# Patient Record
Sex: FEMALE | Race: BLACK OR AFRICAN AMERICAN | Employment: UNEMPLOYED | URBAN - METROPOLITAN AREA
[De-identification: names, ages, dates, MRNs, and addresses within clinical notes are randomized per-mention and may not be internally consistent; named-entity substitution may affect disease eponyms.]

---

## 2018-03-12 DIAGNOSIS — R56.9 SEIZURE (HCC): ICD-10-CM

## 2018-03-12 RX ORDER — LACOSAMIDE 100 MG/1
100 TABLET ORAL 2 TIMES DAILY
Qty: 60 TABLET | Refills: 3 | OUTPATIENT
Start: 2018-03-12 | End: 2018-07-09 | Stop reason: SDUPTHER

## 2018-05-09 ENCOUNTER — HOSPITAL ENCOUNTER (EMERGENCY)
Age: 63
Discharge: HOME | End: 2018-05-10
Attending: EMERGENCY MEDICINE
Payer: MEDICAID

## 2018-05-09 DIAGNOSIS — R56.9 SEIZURE-LIKE ACTIVITY (HCC): Primary | ICD-10-CM

## 2018-05-09 PROCEDURE — 99284 EMERGENCY DEPT VISIT MOD MDM: CPT

## 2018-05-09 RX ORDER — 0.9 % SODIUM CHLORIDE 0.9 %
1000 INTRAVENOUS SOLUTION INTRAVENOUS ONCE
Status: COMPLETED | OUTPATIENT
Start: 2018-05-09 | End: 2018-05-10

## 2018-05-09 RX ORDER — SODIUM CHLORIDE 0.9 % (FLUSH) 0.9 %
10 SYRINGE (ML) INJECTION PRN
Status: DISCONTINUED | OUTPATIENT
Start: 2018-05-09 | End: 2018-05-10 | Stop reason: HOSPADM

## 2018-05-09 ASSESSMENT — ENCOUNTER SYMPTOMS
ABDOMINAL PAIN: 0
COUGH: 0
NAUSEA: 0
VOMITING: 0
BLOOD IN STOOL: 0
SHORTNESS OF BREATH: 0
BACK PAIN: 0

## 2018-05-10 VITALS
WEIGHT: 130 LBS | OXYGEN SATURATION: 97 % | RESPIRATION RATE: 18 BRPM | TEMPERATURE: 98.1 F | HEIGHT: 60 IN | HEART RATE: 75 BPM | DIASTOLIC BLOOD PRESSURE: 63 MMHG | SYSTOLIC BLOOD PRESSURE: 99 MMHG | BODY MASS INDEX: 25.52 KG/M2

## 2018-05-10 LAB
ALBUMIN SERPL-MCNC: 3.7 G/DL (ref 3.5–5.2)
ALP BLD-CCNC: 145 U/L (ref 35–104)
ALT SERPL-CCNC: 8 U/L (ref 0–32)
ANION GAP SERPL CALCULATED.3IONS-SCNC: 10 MMOL/L (ref 7–16)
AST SERPL-CCNC: 18 U/L (ref 0–31)
BASOPHILS ABSOLUTE: 0.05 E9/L (ref 0–0.2)
BASOPHILS RELATIVE PERCENT: 0.5 % (ref 0–2)
BILIRUB SERPL-MCNC: <0.2 MG/DL (ref 0–1.2)
BUN BLDV-MCNC: 16 MG/DL (ref 8–23)
CALCIUM SERPL-MCNC: 9.3 MG/DL (ref 8.6–10.2)
CHLORIDE BLD-SCNC: 103 MMOL/L (ref 98–107)
CO2: 27 MMOL/L (ref 22–29)
CREAT SERPL-MCNC: 0.8 MG/DL (ref 0.5–1)
EOSINOPHILS ABSOLUTE: 0.11 E9/L (ref 0.05–0.5)
EOSINOPHILS RELATIVE PERCENT: 1.1 % (ref 0–6)
GFR AFRICAN AMERICAN: >60
GFR NON-AFRICAN AMERICAN: >60 ML/MIN/1.73
GLUCOSE BLD-MCNC: 93 MG/DL (ref 74–109)
HCT VFR BLD CALC: 39.6 % (ref 34–48)
HEMOGLOBIN: 12.7 G/DL (ref 11.5–15.5)
IMMATURE GRANULOCYTES #: 0.04 E9/L
IMMATURE GRANULOCYTES %: 0.4 % (ref 0–5)
LACTIC ACID: 1.2 MMOL/L (ref 0.5–2.2)
LYMPHOCYTES ABSOLUTE: 2.28 E9/L (ref 1.5–4)
LYMPHOCYTES RELATIVE PERCENT: 23.5 % (ref 20–42)
MCH RBC QN AUTO: 28.9 PG (ref 26–35)
MCHC RBC AUTO-ENTMCNC: 32.1 % (ref 32–34.5)
MCV RBC AUTO: 90.2 FL (ref 80–99.9)
MONOCYTES ABSOLUTE: 1.14 E9/L (ref 0.1–0.95)
MONOCYTES RELATIVE PERCENT: 11.8 % (ref 2–12)
NEUTROPHILS ABSOLUTE: 6.07 E9/L (ref 1.8–7.3)
NEUTROPHILS RELATIVE PERCENT: 62.7 % (ref 43–80)
PDW BLD-RTO: 13.3 FL (ref 11.5–15)
PLATELET # BLD: 209 E9/L (ref 130–450)
PMV BLD AUTO: 12.2 FL (ref 7–12)
POTASSIUM SERPL-SCNC: 4.5 MMOL/L (ref 3.5–5)
RBC # BLD: 4.39 E12/L (ref 3.5–5.5)
SODIUM BLD-SCNC: 140 MMOL/L (ref 132–146)
TOTAL PROTEIN: 7.4 G/DL (ref 6.4–8.3)
WBC # BLD: 9.7 E9/L (ref 4.5–11.5)

## 2018-05-10 PROCEDURE — 80053 COMPREHEN METABOLIC PANEL: CPT

## 2018-05-10 PROCEDURE — 2580000003 HC RX 258: Performed by: EMERGENCY MEDICINE

## 2018-05-10 PROCEDURE — 83605 ASSAY OF LACTIC ACID: CPT

## 2018-05-10 PROCEDURE — 85025 COMPLETE CBC W/AUTO DIFF WBC: CPT

## 2018-05-10 RX ADMIN — SODIUM CHLORIDE 1000 ML: 9 INJECTION, SOLUTION INTRAVENOUS at 00:09

## 2018-05-21 ENCOUNTER — HOSPITAL ENCOUNTER (EMERGENCY)
Age: 63
Discharge: HOME | End: 2018-05-22
Attending: EMERGENCY MEDICINE
Payer: MEDICAID

## 2018-05-21 ENCOUNTER — APPOINTMENT (OUTPATIENT)
Dept: GENERAL RADIOLOGY | Age: 63
End: 2018-05-21
Payer: MEDICAID

## 2018-05-21 VITALS
SYSTOLIC BLOOD PRESSURE: 125 MMHG | TEMPERATURE: 97 F | OXYGEN SATURATION: 97 % | WEIGHT: 130 LBS | RESPIRATION RATE: 17 BRPM | DIASTOLIC BLOOD PRESSURE: 73 MMHG | BODY MASS INDEX: 25.39 KG/M2 | HEART RATE: 66 BPM

## 2018-05-21 DIAGNOSIS — R56.9 SEIZURE (HCC): Primary | ICD-10-CM

## 2018-05-21 LAB
BASOPHILS ABSOLUTE: 0.06 E9/L (ref 0–0.2)
BASOPHILS RELATIVE PERCENT: 0.7 % (ref 0–2)
EOSINOPHILS ABSOLUTE: 0.16 E9/L (ref 0.05–0.5)
EOSINOPHILS RELATIVE PERCENT: 1.8 % (ref 0–6)
HCT VFR BLD CALC: 38.3 % (ref 34–48)
HEMOGLOBIN: 11.9 G/DL (ref 11.5–15.5)
IMMATURE GRANULOCYTES #: 0.02 E9/L
IMMATURE GRANULOCYTES %: 0.2 % (ref 0–5)
LYMPHOCYTES ABSOLUTE: 2.81 E9/L (ref 1.5–4)
LYMPHOCYTES RELATIVE PERCENT: 31 % (ref 20–42)
MCH RBC QN AUTO: 28.1 PG (ref 26–35)
MCHC RBC AUTO-ENTMCNC: 31.1 % (ref 32–34.5)
MCV RBC AUTO: 90.3 FL (ref 80–99.9)
MONOCYTES ABSOLUTE: 1.24 E9/L (ref 0.1–0.95)
MONOCYTES RELATIVE PERCENT: 13.7 % (ref 2–12)
NEUTROPHILS ABSOLUTE: 4.77 E9/L (ref 1.8–7.3)
NEUTROPHILS RELATIVE PERCENT: 52.6 % (ref 43–80)
PDW BLD-RTO: 13.3 FL (ref 11.5–15)
PLATELET # BLD: 222 E9/L (ref 130–450)
PMV BLD AUTO: 12.7 FL (ref 7–12)
RBC # BLD: 4.24 E12/L (ref 3.5–5.5)
WBC # BLD: 9.1 E9/L (ref 4.5–11.5)

## 2018-05-21 PROCEDURE — 99284 EMERGENCY DEPT VISIT MOD MDM: CPT

## 2018-05-21 PROCEDURE — 80053 COMPREHEN METABOLIC PANEL: CPT

## 2018-05-21 PROCEDURE — 94761 N-INVAS EAR/PLS OXIMETRY MLT: CPT

## 2018-05-21 PROCEDURE — 96365 THER/PROPH/DIAG IV INF INIT: CPT

## 2018-05-21 PROCEDURE — 36415 COLL VENOUS BLD VENIPUNCTURE: CPT

## 2018-05-21 PROCEDURE — 2580000003 HC RX 258: Performed by: EMERGENCY MEDICINE

## 2018-05-21 PROCEDURE — 71045 X-RAY EXAM CHEST 1 VIEW: CPT

## 2018-05-21 PROCEDURE — 6360000002 HC RX W HCPCS: Performed by: EMERGENCY MEDICINE

## 2018-05-21 PROCEDURE — 85025 COMPLETE CBC W/AUTO DIFF WBC: CPT

## 2018-05-21 RX ORDER — 0.9 % SODIUM CHLORIDE 0.9 %
500 INTRAVENOUS SOLUTION INTRAVENOUS ONCE
Status: COMPLETED | OUTPATIENT
Start: 2018-05-21 | End: 2018-05-22

## 2018-05-21 RX ORDER — LEVETIRACETAM 5 MG/ML
500 INJECTION INTRAVASCULAR EVERY 12 HOURS
Status: DISCONTINUED | OUTPATIENT
Start: 2018-05-21 | End: 2018-05-22 | Stop reason: HOSPADM

## 2018-05-21 RX ADMIN — SODIUM CHLORIDE 500 ML: 9 INJECTION, SOLUTION INTRAVENOUS at 23:13

## 2018-05-21 RX ADMIN — LEVETIRACETAM 500 MG: 5 INJECTION INTRAVENOUS at 23:25

## 2018-05-22 LAB
ALBUMIN SERPL-MCNC: 3.9 G/DL (ref 3.5–5.2)
ALP BLD-CCNC: 128 U/L (ref 35–104)
ALT SERPL-CCNC: 14 U/L (ref 0–32)
ANION GAP SERPL CALCULATED.3IONS-SCNC: 11 MMOL/L (ref 7–16)
AST SERPL-CCNC: 29 U/L (ref 0–31)
BILIRUB SERPL-MCNC: 0.2 MG/DL (ref 0–1.2)
BUN BLDV-MCNC: 19 MG/DL (ref 8–23)
CALCIUM SERPL-MCNC: 9.5 MG/DL (ref 8.6–10.2)
CHLORIDE BLD-SCNC: 102 MMOL/L (ref 98–107)
CO2: 29 MMOL/L (ref 22–29)
CREAT SERPL-MCNC: 0.9 MG/DL (ref 0.5–1)
GFR AFRICAN AMERICAN: >60
GFR NON-AFRICAN AMERICAN: >60 ML/MIN/1.73
GLUCOSE BLD-MCNC: 92 MG/DL (ref 74–109)
POTASSIUM SERPL-SCNC: 5.5 MMOL/L (ref 3.5–5)
SODIUM BLD-SCNC: 142 MMOL/L (ref 132–146)
TOTAL PROTEIN: 7.4 G/DL (ref 6.4–8.3)

## 2018-06-07 LAB
EKG ATRIAL RATE: 59 BPM
EKG P AXIS: 69 DEGREES
EKG P-R INTERVAL: 146 MS
EKG Q-T INTERVAL: 408 MS
EKG QRS DURATION: 114 MS
EKG QTC CALCULATION (BAZETT): 403 MS
EKG R AXIS: 35 DEGREES
EKG T AXIS: -156 DEGREES
EKG VENTRICULAR RATE: 59 BPM

## 2018-07-09 DIAGNOSIS — R56.9 SEIZURE (HCC): ICD-10-CM

## 2018-07-09 RX ORDER — LACOSAMIDE 100 MG/1
100 TABLET ORAL 2 TIMES DAILY
Qty: 60 TABLET | Refills: 3 | Status: SHIPPED | OUTPATIENT
Start: 2018-07-09 | End: 2018-10-05 | Stop reason: DRUGHIGH

## 2018-07-16 ENCOUNTER — OFFICE VISIT (OUTPATIENT)
Dept: NEUROLOGY | Age: 63
End: 2018-07-16
Payer: MEDICAID

## 2018-07-16 VITALS
OXYGEN SATURATION: 99 % | HEART RATE: 70 BPM | DIASTOLIC BLOOD PRESSURE: 58 MMHG | HEIGHT: 62 IN | SYSTOLIC BLOOD PRESSURE: 95 MMHG

## 2018-07-16 DIAGNOSIS — R56.9 SEIZURE (HCC): Primary | ICD-10-CM

## 2018-07-16 PROCEDURE — 99214 OFFICE O/P EST MOD 30 MIN: CPT | Performed by: CLINICAL NURSE SPECIALIST

## 2018-09-04 ENCOUNTER — HOSPITAL ENCOUNTER (EMERGENCY)
Age: 63
Discharge: HOME | End: 2018-09-05
Attending: EMERGENCY MEDICINE
Payer: MEDICAID

## 2018-09-04 ENCOUNTER — APPOINTMENT (OUTPATIENT)
Dept: CT IMAGING | Age: 63
End: 2018-09-04
Payer: MEDICAID

## 2018-09-04 VITALS
DIASTOLIC BLOOD PRESSURE: 56 MMHG | TEMPERATURE: 97.6 F | HEART RATE: 79 BPM | OXYGEN SATURATION: 100 % | SYSTOLIC BLOOD PRESSURE: 101 MMHG | WEIGHT: 130 LBS | HEIGHT: 62 IN | BODY MASS INDEX: 23.92 KG/M2 | RESPIRATION RATE: 16 BRPM

## 2018-09-04 DIAGNOSIS — R56.9 SEIZURE (HCC): Primary | ICD-10-CM

## 2018-09-04 LAB
ANION GAP SERPL CALCULATED.3IONS-SCNC: 11 MMOL/L (ref 7–16)
BASOPHILS ABSOLUTE: 0.05 E9/L (ref 0–0.2)
BASOPHILS RELATIVE PERCENT: 0.5 % (ref 0–2)
BUN BLDV-MCNC: 14 MG/DL (ref 8–23)
CALCIUM SERPL-MCNC: 9.6 MG/DL (ref 8.6–10.2)
CHLORIDE BLD-SCNC: 103 MMOL/L (ref 98–107)
CO2: 27 MMOL/L (ref 22–29)
CREAT SERPL-MCNC: 0.7 MG/DL (ref 0.5–1)
EOSINOPHILS ABSOLUTE: 0.08 E9/L (ref 0.05–0.5)
EOSINOPHILS RELATIVE PERCENT: 0.8 % (ref 0–6)
GFR AFRICAN AMERICAN: >60
GFR NON-AFRICAN AMERICAN: >60 ML/MIN/1.73
GLUCOSE BLD-MCNC: 106 MG/DL (ref 74–109)
HCT VFR BLD CALC: 37.7 % (ref 34–48)
HEMOGLOBIN: 12.5 G/DL (ref 11.5–15.5)
IMMATURE GRANULOCYTES #: 0.03 E9/L
IMMATURE GRANULOCYTES %: 0.3 % (ref 0–5)
LACTIC ACID: 1.7 MMOL/L (ref 0.5–2.2)
LYMPHOCYTES ABSOLUTE: 1.81 E9/L (ref 1.5–4)
LYMPHOCYTES RELATIVE PERCENT: 18 % (ref 20–42)
MAGNESIUM: 2 MG/DL (ref 1.6–2.6)
MCH RBC QN AUTO: 29.3 PG (ref 26–35)
MCHC RBC AUTO-ENTMCNC: 33.2 % (ref 32–34.5)
MCV RBC AUTO: 88.3 FL (ref 80–99.9)
MONOCYTES ABSOLUTE: 0.97 E9/L (ref 0.1–0.95)
MONOCYTES RELATIVE PERCENT: 9.6 % (ref 2–12)
NEUTROPHILS ABSOLUTE: 7.13 E9/L (ref 1.8–7.3)
NEUTROPHILS RELATIVE PERCENT: 70.8 % (ref 43–80)
PDW BLD-RTO: 13.2 FL (ref 11.5–15)
PLATELET # BLD: 189 E9/L (ref 130–450)
PMV BLD AUTO: 13.4 FL (ref 7–12)
POTASSIUM SERPL-SCNC: 4.4 MMOL/L (ref 3.5–5)
RBC # BLD: 4.27 E12/L (ref 3.5–5.5)
SODIUM BLD-SCNC: 141 MMOL/L (ref 132–146)
WBC # BLD: 10.1 E9/L (ref 4.5–11.5)

## 2018-09-04 PROCEDURE — 80048 BASIC METABOLIC PNL TOTAL CA: CPT

## 2018-09-04 PROCEDURE — 83735 ASSAY OF MAGNESIUM: CPT

## 2018-09-04 PROCEDURE — 80177 DRUG SCRN QUAN LEVETIRACETAM: CPT

## 2018-09-04 PROCEDURE — 85025 COMPLETE CBC W/AUTO DIFF WBC: CPT

## 2018-09-04 PROCEDURE — 99284 EMERGENCY DEPT VISIT MOD MDM: CPT

## 2018-09-04 PROCEDURE — 83605 ASSAY OF LACTIC ACID: CPT

## 2018-09-04 PROCEDURE — 70450 CT HEAD/BRAIN W/O DYE: CPT

## 2018-09-04 PROCEDURE — 6360000002 HC RX W HCPCS: Performed by: EMERGENCY MEDICINE

## 2018-09-04 PROCEDURE — 94761 N-INVAS EAR/PLS OXIMETRY MLT: CPT

## 2018-09-04 PROCEDURE — 96374 THER/PROPH/DIAG INJ IV PUSH: CPT

## 2018-09-04 RX ORDER — LEVETIRACETAM 10 MG/ML
1000 INJECTION INTRAVASCULAR ONCE
Status: COMPLETED | OUTPATIENT
Start: 2018-09-04 | End: 2018-09-04

## 2018-09-04 RX ADMIN — LEVETIRACETAM 1000 MG: 10 INJECTION INTRAVENOUS at 21:10

## 2018-09-05 NOTE — ED PROVIDER NOTES
caregiver. Skin: Skin is warm and dry. She is not diaphoretic. Psychiatric: She is slowed. She is noncommunicative. Nursing note and vitals reviewed. Procedures    MDM  Number of Diagnoses or Management Options  Seizure [facility]):   Diagnosis management comments: Patient presents to the ED for seizures. Seizure precautions at place initially. The patient was evaluated for ICH and electrolyte deficits including hyponatremia and hypoglycemia. Workup in the ED revealed seizure. The patient was monitored in the ED with no further episodes. Patient returned back to her baseline on evaluation prior to disposition. Patient was given keppra for their symptoms with mild improvement. This medication was initially given as her medication list was not found until discussed with caregiver. The patient is on VIMPAT and would possibly benefit from increased dose. Patient continues to be non-toxic on re-evaluation. Findings were discussed with the patient and reasons to immediately return to the ED were articulated to them. They will follow-up with their PMD.          --------------------------------------------- PAST HISTORY ---------------------------------------------  Past Medical History:  has a past medical history of Amenorrhea; Anemia; Constipation; Developmental disability; Dry eyes; Encephalopathy; Kyphoscoliosis; Leukocytosis; Menopausal and perimenopausal disorder; Mental retardation; Osteoporosis; Seizure disorder ([facility] [address] and [facility]ures ([facility] Past Surgical History:  has no past surgical history on file. Social History:  reports that she has never smoked. She has never used smokeless tobacco. She reports that she does not drink alcohol or use drugs. Family History: family history is not on file. The patients home medications have been reviewed. Allergies: Patient has no known allergies.     -------------------------------------------------- RESULTS -------------------------------------------------    Lab  Results for orders placed or performed during the hospital encounter of 09/04/18   CBC auto differential   Result Value Ref Range    WBC 10.1 4.5 - 11.5 E9/L    RBC 4.27 3.50 - 5.50 E12/L    Hemoglobin 12.5 11.5 - 15.5 g/dL    Hematocrit 37.7 34.0 - 48.0 %    MCV 88.3 80.0 - 99.9 fL    MCH 29.3 26.0 - 35.0 pg    MCHC 33.2 32.0 - 34.5 %    RDW 13.2 11.5 - 15.0 fL    Platelets 901 261 - 939 E9/L    MPV 13.4 (H) 7.0 - 12.0 fL    Neutrophils % 70.8 43.0 - 80.0 %    Immature Granulocytes % 0.3 0.0 - 5.0 %    Lymphocytes % 18.0 (L) 20.0 - 42.0 %    Monocytes % 9.6 2.0 - 12.0 %    Eosinophils % 0.8 0.0 - 6.0 %    Basophils % 0.5 0.0 - 2.0 %    Neutrophils # 7.13 1.80 - 7.30 E9/L    Immature Granulocytes # 0.03 E9/L    Lymphocytes # 1.81 1.50 - 4.00 E9/L    Monocytes # 0.97 (H) 0.10 - 0.95 E9/L    Eosinophils # 0.08 0.05 - 0.50 E9/L    Basophils # 0.05 0.00 - 0.20 H5/M   Basic Metabolic Panel   Result Value Ref Range    Sodium 141 132 - 146 mmol/L    Potassium 4.4 3.5 - 5.0 mmol/L    Chloride 103 98 - 107 mmol/L    CO2 27 22 - 29 mmol/L    Anion Gap 11 7 - 16 mmol/L    Glucose 106 74 - 109 mg/dL    BUN 14 8 - 23 mg/dL    CREATININE 0.7 0.5 - 1.0 mg/dL    GFR Non-African American >60 >=60 mL/min/1.73    GFR African American >60     Calcium 9.6 8.6 - 10.2 mg/dL   Lactic Acid, Plasma   Result Value Ref Range    Lactic Acid 1.7 0.5 - 2.2 mmol/L   Magnesium   Result Value Ref Range    Magnesium 2.0 1.6 - 2.6 mg/dL       Radiology  CT Head WO Contrast   Final Result      No significant changes since November 2017 study. No indication for an acute intracranial event. See above comments. ------------------------- NURSING NOTES AND VITALS REVIEWED ---------------------------  Date / Time Roomed:  9/4/2018  8:07 PM  ED Bed Assignment:  NIA/NIA    The nursing notes within the ED encounter and vital signs as below have been reviewed.    Patient Vitals for the past 24 hrs:   BP Temp Temp src Pulse Resp SpO2 Height Weight   09/04/18 2315 (!) 101/56 - - - - 100 % - -   09/04/18 2245 (!) 100/53 - - - - 100 % - -   09/04/18 2215 (!) 94/57 - - - - 98 % - -   09/04/18 2158 98/60 - - - - - - -   09/04/18 2011 (!) 90/55 97.6 °F (36.4 °C) Oral 79 16 96 % 5' 2\" (1.575 m) 130 lb (59 kg)       Oxygen Saturation Interpretation: Normal    ------------------------------------------ PROGRESS NOTES ------------------------------------------  ED Course as of Sep 05 1128   Tue Sep 04, 2018   2158 Patient reassessed. Patient returning back to baseline according to caregiver at bedside now. Patient following commands. Patient able to  my hands and move all extremities. [MA]      ED Course User Index  [MA] Bernardo Joyce DO         12:31 AM  I have spoken with the patient and discussed todays results, in addition to providing specific details for the plan of care and counseling regarding the diagnosis and prognosis. Their questions are answered at this time and they are agreeable with the plan. I discussed at length with them reasons for immediate return here for re evaluation. They will followup with their primary care physician by calling their office tomorrow. --------------------------------- ADDITIONAL PROVIDER NOTES ---------------------------------  At this time the patient is without objective evidence of an acute process requiring hospitalization or inpatient management. They have remained hemodynamically stable throughout their entire ED visit and are stable for discharge with outpatient follow-up. The plan has been discussed in detail and they are aware of the specific conditions for emergent return, as well as the importance of follow-up. Discharge Medication List as of 9/4/2018 11:06 PM          Diagnosis:  1. Seizure [facility])        Disposition:  Patient's disposition: Discharge to home  Patient's condition is stable.                  Bernardo Joyce,

## 2018-09-06 LAB — KEPPRA: 42 UG/ML (ref 12–46)

## 2018-09-19 ENCOUNTER — TELEPHONE (OUTPATIENT)
Dept: ADMINISTRATIVE | Age: 63
End: 2018-09-19

## 2018-09-19 NOTE — TELEPHONE ENCOUNTER
Pt was seen in ER for a seizure on 9/4/18, per Dr. Michael Khan, he wants Sarah Beth Cox to see her. Unable to schedule within timeframe requested. Please call her to schedule.

## 2018-09-26 ENCOUNTER — TELEPHONE (OUTPATIENT)
Dept: NEUROLOGY | Age: 63
End: 2018-09-26

## 2018-10-05 ENCOUNTER — OFFICE VISIT (OUTPATIENT)
Dept: NEUROLOGY | Age: 63
End: 2018-10-05
Payer: MEDICAID

## 2018-10-05 VITALS — SYSTOLIC BLOOD PRESSURE: 103 MMHG | DIASTOLIC BLOOD PRESSURE: 71 MMHG | HEART RATE: 74 BPM | OXYGEN SATURATION: 80 %

## 2018-10-05 DIAGNOSIS — R56.9 SEIZURE (HCC): Primary | ICD-10-CM

## 2018-10-05 PROCEDURE — 99215 OFFICE O/P EST HI 40 MIN: CPT | Performed by: CLINICAL NURSE SPECIALIST

## 2018-10-05 RX ORDER — LACOSAMIDE 150 MG/1
150 TABLET ORAL 2 TIMES DAILY
Qty: 60 TABLET | Refills: 3 | Status: SHIPPED | OUTPATIENT
Start: 2018-10-05 | End: 2018-10-05 | Stop reason: SDUPTHER

## 2018-10-05 RX ORDER — LACOSAMIDE 150 MG/1
150 TABLET ORAL 2 TIMES DAILY
Qty: 60 TABLET | Refills: 2 | Status: SHIPPED | OUTPATIENT
Start: 2018-10-05 | End: 2018-12-06 | Stop reason: SDUPTHER

## 2018-10-05 NOTE — PROGRESS NOTES
Odalys Thompson is a 66 y.o.  female     From [facility]    Previously following with our office for epilepsy   Maintained on Keppra and Dilantin for years    Due to recurrent seizures, we were transitioning from Dilantin to Vimpat     Now taking 2 AEDs     Keppra 1500 BID and Vimpat 100 BID     Unfortunately suffered 4 more GTC seizures and was evaluated in the ED   She was discharged and instructed to follow with me -- appt scheduled    Since discharge, staff deny any recurrent seizures     Labs reviewed from September 2018    No new medical events reported    ROS unobtainable due to patient condition     Prior to Visit Medications    Medication Sig Taking? Authorizing Provider   lacosamide (VIMPAT) 150 MG TABS tablet Take 1 tablet by mouth 2 times daily for 90 days. Cathleen Crigler, APRN - CNS   guaiFENesin-dextromethorphan (ROBITUSSIN DM) 100-10 MG/5ML syrup Take 5 mLs by mouth 3 times daily as needed for Cough Yes Historical Provider, MD   ibuprofen (ADVIL;MOTRIN) 400 MG tablet Take 400 mg by mouth every 6 hours as needed for Pain Yes Historical Provider, MD   Sennosides (SENOKOT PO) Take 1 tablet by mouth nightly Yes Historical Provider, MD   Lactobacillus (ACIDOPHILUS/BIFIDUS PO) Take 1 tablet by mouth daily Yes Historical Provider, MD   levETIRAcetam (KEPPRA) 750 MG tablet Take 2 tablets by mouth 2 times daily Yes Ned Ya DO   alendronate (FOSAMAX) 70 MG tablet Take 70 mg by mouth every 7 days Wednesday Yes Historical Provider, MD   Multiple Vitamins-Minerals (THERAPEUTIC MULTIVITAMIN-MINERALS) tablet Take 1 tablet by mouth daily Yes Historical Provider, MD   calcium-vitamin D (OSCAL-500) 500-200 MG-UNIT per tablet Take 1 tablet by mouth 3 times daily With meals Yes Historical Provider, MD   docusate sodium (COLACE) 100 MG capsule Take 100 mg by mouth nightly. Yes Historical Provider, MD   polyethylene glycol (MIRALAX) powder Take 17 g by mouth daily.  Mixed in 8 ounces of

## 2018-11-28 LAB
ALBUMIN SERPL-MCNC: 3.8 G/DL
ALP BLD-CCNC: 121 U/L
ALT SERPL-CCNC: 15 U/L
ANION GAP SERPL CALCULATED.3IONS-SCNC: NORMAL MMOL/L
AST SERPL-CCNC: 19 U/L
BASOPHILS ABSOLUTE: 0.02 /ΜL
BASOPHILS RELATIVE PERCENT: 0.3 %
BILIRUB SERPL-MCNC: 0.3 MG/DL (ref 0.1–1.4)
BUN BLDV-MCNC: 16 MG/DL
CALCIUM SERPL-MCNC: 9.3 MG/DL
CHLORIDE BLD-SCNC: 103 MMOL/L
CHOLESTEROL, FASTING: 174
CO2: 32 MMOL/L
CREAT SERPL-MCNC: 0.6 MG/DL
EOSINOPHILS ABSOLUTE: 0.17 /ΜL
EOSINOPHILS RELATIVE PERCENT: 2.2 %
FOLATE: 12.14
GFR CALCULATED: 114.3
GLUCOSE BLD-MCNC: 81 MG/DL
HCT VFR BLD CALC: 38.1 % (ref 36–46)
HDLC SERPL-MCNC: 85 MG/DL (ref 35–70)
HEMOGLOBIN: 12.3 G/DL (ref 12–16)
LDL CHOLESTEROL CALCULATED: 81 MG/DL (ref 0–160)
LYMPHOCYTES ABSOLUTE: 2.42 /ΜL
LYMPHOCYTES RELATIVE PERCENT: 30.6 %
MCH RBC QN AUTO: 30 PG
MCHC RBC AUTO-ENTMCNC: 32.3 G/DL
MCV RBC AUTO: 93 FL
MONOCYTES ABSOLUTE: 0.71 /ΜL
MONOCYTES RELATIVE PERCENT: 0.9 %
NEUTROPHILS ABSOLUTE: 4.29 /ΜL
NEUTROPHILS RELATIVE PERCENT: 54.2 %
PDW BLD-RTO: 12.8 %
PLATELET # BLD: 335 K/ΜL
PMV BLD AUTO: ABNORMAL FL
POTASSIUM SERPL-SCNC: 4.6 MMOL/L
RBC # BLD: 4.09 10^6/ΜL
SODIUM BLD-SCNC: 140 MMOL/L
TOTAL PROTEIN: 6.4
TRIGLYCERIDE, FASTING: 41
VITAMIN B-12: 467
WBC # BLD: 7.92 10^3/ML

## 2018-12-06 DIAGNOSIS — R56.9 SEIZURE (HCC): ICD-10-CM

## 2018-12-06 RX ORDER — LACOSAMIDE 150 MG/1
150 TABLET ORAL 2 TIMES DAILY
Qty: 60 TABLET | Refills: 2 | Status: SHIPPED | OUTPATIENT
Start: 2018-12-06 | End: 2019-03-11 | Stop reason: SDUPTHER

## 2019-01-01 ENCOUNTER — HOSPITAL ENCOUNTER (OUTPATIENT)
Age: 64
Discharge: HOME | End: 2019-12-21

## 2019-01-01 ENCOUNTER — OFFICE VISIT (OUTPATIENT)
Dept: NEUROLOGY | Age: 64
End: 2019-01-01
Payer: MEDICAID

## 2019-01-01 ENCOUNTER — HOSPITAL ENCOUNTER (OUTPATIENT)
Dept: GENERAL RADIOLOGY | Age: 64
Discharge: HOME | End: 2019-07-18
Payer: MEDICAID

## 2019-01-01 VITALS — HEART RATE: 77 BPM | DIASTOLIC BLOOD PRESSURE: 76 MMHG | OXYGEN SATURATION: 97 % | SYSTOLIC BLOOD PRESSURE: 123 MMHG

## 2019-01-01 DIAGNOSIS — R56.9 SEIZURE (HCC): ICD-10-CM

## 2019-01-01 DIAGNOSIS — Z12.31 VISIT FOR SCREENING MAMMOGRAM: ICD-10-CM

## 2019-01-01 DIAGNOSIS — R56.9 SEIZURE (HCC): Primary | ICD-10-CM

## 2019-01-01 PROCEDURE — 77063 BREAST TOMOSYNTHESIS BI: CPT

## 2019-01-01 PROCEDURE — 88305 TISSUE EXAM BY PATHOLOGIST: CPT

## 2019-01-01 PROCEDURE — 99214 OFFICE O/P EST MOD 30 MIN: CPT | Performed by: CLINICAL NURSE SPECIALIST

## 2019-01-01 RX ORDER — LACOSAMIDE 150 MG/1
150 TABLET ORAL 2 TIMES DAILY
Qty: 62 TABLET | Refills: 5 | Status: ON HOLD | OUTPATIENT
Start: 2019-01-01 | End: 2020-01-01 | Stop reason: SDUPTHER

## 2019-01-10 DIAGNOSIS — Z12.11 SCREENING FOR COLON CANCER: Primary | ICD-10-CM

## 2019-01-10 LAB
CONTROL: POSITIVE
HEMOCCULT STL QL: NEGATIVE

## 2019-01-10 PROCEDURE — 82274 ASSAY TEST FOR BLOOD FECAL: CPT | Performed by: FAMILY MEDICINE

## 2019-02-04 ENCOUNTER — OFFICE VISIT (OUTPATIENT)
Dept: NEUROLOGY | Age: 64
End: 2019-02-04
Payer: MEDICAID

## 2019-02-04 VITALS — HEART RATE: 69 BPM | OXYGEN SATURATION: 81 % | DIASTOLIC BLOOD PRESSURE: 79 MMHG | SYSTOLIC BLOOD PRESSURE: 123 MMHG

## 2019-02-04 DIAGNOSIS — R56.9 SEIZURE (HCC): Primary | ICD-10-CM

## 2019-02-04 PROCEDURE — 99214 OFFICE O/P EST MOD 30 MIN: CPT | Performed by: CLINICAL NURSE SPECIALIST

## 2019-02-04 RX ORDER — ACETAMINOPHEN 325 MG/1
650 TABLET ORAL EVERY 6 HOURS PRN
COMMUNITY

## 2019-03-11 DIAGNOSIS — R56.9 SEIZURE (HCC): ICD-10-CM

## 2019-03-11 RX ORDER — LACOSAMIDE 150 MG/1
150 TABLET ORAL 2 TIMES DAILY
Qty: 62 TABLET | Refills: 2 | Status: SHIPPED | OUTPATIENT
Start: 2019-03-11 | End: 2019-06-10 | Stop reason: SDUPTHER

## 2019-06-10 DIAGNOSIS — R56.9 SEIZURE (HCC): ICD-10-CM

## 2019-06-10 RX ORDER — LACOSAMIDE 150 MG/1
150 TABLET ORAL 2 TIMES DAILY
Qty: 62 TABLET | Refills: 5 | Status: SHIPPED | OUTPATIENT
Start: 2019-06-10 | End: 2019-01-01 | Stop reason: SDUPTHER

## 2020-01-01 ENCOUNTER — APPOINTMENT (OUTPATIENT)
Dept: CT IMAGING | Age: 65
DRG: 720 | End: 2020-01-01
Payer: MEDICAID

## 2020-01-01 ENCOUNTER — OFFICE VISIT (OUTPATIENT)
Dept: NEUROLOGY | Age: 65
End: 2020-01-01
Payer: MEDICAID

## 2020-01-01 ENCOUNTER — APPOINTMENT (OUTPATIENT)
Dept: ULTRASOUND IMAGING | Age: 65
End: 2020-01-01
Payer: MEDICAID

## 2020-01-01 ENCOUNTER — APPOINTMENT (OUTPATIENT)
Dept: ULTRASOUND IMAGING | Age: 65
DRG: 720 | End: 2020-01-01
Payer: MEDICAID

## 2020-01-01 ENCOUNTER — HOSPITAL ENCOUNTER (OUTPATIENT)
Age: 65
Discharge: HOME | End: 2020-06-18

## 2020-01-01 ENCOUNTER — HOSPITAL ENCOUNTER (EMERGENCY)
Age: 65
Discharge: HOME | End: 2020-02-01
Attending: EMERGENCY MEDICINE
Payer: MEDICAID

## 2020-01-01 ENCOUNTER — HOSPITAL ENCOUNTER (INPATIENT)
Age: 65
LOS: 14 days | Discharge: HOSPICE | DRG: 720 | End: 2020-06-10
Attending: EMERGENCY MEDICINE | Admitting: INTERNAL MEDICINE
Payer: MEDICAID

## 2020-01-01 ENCOUNTER — APPOINTMENT (OUTPATIENT)
Dept: GENERAL RADIOLOGY | Age: 65
DRG: 720 | End: 2020-01-01
Payer: MEDICAID

## 2020-01-01 ENCOUNTER — ANESTHESIA EVENT (OUTPATIENT)
Dept: ENDOSCOPY | Age: 65
DRG: 720 | End: 2020-01-01
Payer: MEDICAID

## 2020-01-01 ENCOUNTER — ANESTHESIA (OUTPATIENT)
Dept: ENDOSCOPY | Age: 65
DRG: 720 | End: 2020-01-01
Payer: MEDICAID

## 2020-01-01 VITALS
SYSTOLIC BLOOD PRESSURE: 105 MMHG | DIASTOLIC BLOOD PRESSURE: 59 MMHG | RESPIRATION RATE: 19 BRPM | OXYGEN SATURATION: 99 %

## 2020-01-01 VITALS
HEIGHT: 62 IN | DIASTOLIC BLOOD PRESSURE: 84 MMHG | SYSTOLIC BLOOD PRESSURE: 111 MMHG | OXYGEN SATURATION: 95 % | HEART RATE: 77 BPM | WEIGHT: 130 LBS | RESPIRATION RATE: 18 BRPM | BODY MASS INDEX: 23.92 KG/M2 | TEMPERATURE: 98.4 F

## 2020-01-01 VITALS
SYSTOLIC BLOOD PRESSURE: 110 MMHG | HEART RATE: 93 BPM | DIASTOLIC BLOOD PRESSURE: 64 MMHG | RESPIRATION RATE: 16 BRPM | OXYGEN SATURATION: 94 %

## 2020-01-01 VITALS
OXYGEN SATURATION: 94 % | HEART RATE: 90 BPM | DIASTOLIC BLOOD PRESSURE: 69 MMHG | HEIGHT: 62 IN | SYSTOLIC BLOOD PRESSURE: 112 MMHG | BODY MASS INDEX: 22.12 KG/M2 | WEIGHT: 120.19 LBS | RESPIRATION RATE: 34 BRPM | TEMPERATURE: 98.1 F

## 2020-01-01 LAB
ADENOVIRUS BY PCR: NOT DETECTED
ALBUMIN SERPL-MCNC: 2.2 G/DL (ref 3.5–5.2)
ALBUMIN SERPL-MCNC: 2.4 G/DL (ref 3.5–5.2)
ALBUMIN SERPL-MCNC: 3.2 G/DL (ref 3.5–5.2)
ALBUMIN SERPL-MCNC: 3.9 G/DL (ref 3.5–5.2)
ALP BLD-CCNC: 106 U/L (ref 35–104)
ALP BLD-CCNC: 137 U/L (ref 35–104)
ALP BLD-CCNC: 145 U/L (ref 35–104)
ALP BLD-CCNC: 85 U/L (ref 35–104)
ALT SERPL-CCNC: 10 U/L (ref 0–32)
ALT SERPL-CCNC: 14 U/L (ref 0–32)
ALT SERPL-CCNC: 9 U/L (ref 0–32)
ALT SERPL-CCNC: 9 U/L (ref 0–32)
ANAEROBIC CULTURE: ABNORMAL
ANAEROBIC CULTURE: NORMAL
ANION GAP SERPL CALCULATED.3IONS-SCNC: 10 MMOL/L (ref 7–16)
ANION GAP SERPL CALCULATED.3IONS-SCNC: 10 MMOL/L (ref 7–16)
ANION GAP SERPL CALCULATED.3IONS-SCNC: 11 MMOL/L (ref 7–16)
ANION GAP SERPL CALCULATED.3IONS-SCNC: 7 MMOL/L (ref 7–16)
ANION GAP SERPL CALCULATED.3IONS-SCNC: 7 MMOL/L (ref 7–16)
ANION GAP SERPL CALCULATED.3IONS-SCNC: 8 MMOL/L (ref 7–16)
ANION GAP SERPL CALCULATED.3IONS-SCNC: 9 MMOL/L (ref 7–16)
ANISOCYTOSIS: ABNORMAL
ANTISTREPTOLYSIN-O: 143 IU/ML (ref 0–200)
AST SERPL-CCNC: 14 U/L (ref 0–31)
AST SERPL-CCNC: 28 U/L (ref 0–31)
AST SERPL-CCNC: 30 U/L (ref 0–31)
AST SERPL-CCNC: 32 U/L (ref 0–31)
BACTERIA: ABNORMAL /HPF
BACTERIA: NORMAL /HPF
BASOPHILS ABSOLUTE: 0 E9/L (ref 0–0.2)
BASOPHILS ABSOLUTE: 0.03 E9/L (ref 0–0.2)
BASOPHILS ABSOLUTE: 0.04 E9/L (ref 0–0.2)
BASOPHILS ABSOLUTE: 0.05 E9/L (ref 0–0.2)
BASOPHILS ABSOLUTE: 0.07 E9/L (ref 0–0.2)
BASOPHILS ABSOLUTE: 0.08 E9/L (ref 0–0.2)
BASOPHILS ABSOLUTE: 0.09 E9/L (ref 0–0.2)
BASOPHILS RELATIVE PERCENT: 0 % (ref 0–2)
BASOPHILS RELATIVE PERCENT: 0.3 % (ref 0–2)
BASOPHILS RELATIVE PERCENT: 0.4 % (ref 0–2)
BASOPHILS RELATIVE PERCENT: 0.5 % (ref 0–2)
BILIRUB SERPL-MCNC: 0.2 MG/DL (ref 0–1.2)
BILIRUB SERPL-MCNC: 0.2 MG/DL (ref 0–1.2)
BILIRUB SERPL-MCNC: 0.3 MG/DL (ref 0–1.2)
BILIRUB SERPL-MCNC: 0.5 MG/DL (ref 0–1.2)
BILIRUBIN URINE: ABNORMAL
BILIRUBIN URINE: NEGATIVE
BLOOD CULTURE, ROUTINE: NORMAL
BLOOD, URINE: NEGATIVE
BLOOD, URINE: NEGATIVE
BORDETELLA PARAPERTUSSIS BY PCR: NOT DETECTED
BORDETELLA PERTUSSIS BY PCR: NOT DETECTED
BUN BLDV-MCNC: 10 MG/DL (ref 8–23)
BUN BLDV-MCNC: 3 MG/DL (ref 8–23)
BUN BLDV-MCNC: 5 MG/DL (ref 8–23)
BUN BLDV-MCNC: 8 MG/DL (ref 8–23)
BUN BLDV-MCNC: 9 MG/DL (ref 8–23)
BUN BLDV-MCNC: <2 MG/DL (ref 8–23)
BUN BLDV-MCNC: <2 MG/DL (ref 8–23)
BURR CELLS: ABNORMAL
BURR CELLS: ABNORMAL
C-REACTIVE PROTEIN: 8.2 MG/DL (ref 0–0.4)
CA 125: 154.5 U/ML (ref 0–35)
CA 125: 163.5 U/ML (ref 0–35)
CALCIUM SERPL-MCNC: 7.8 MG/DL (ref 8.6–10.2)
CALCIUM SERPL-MCNC: 8 MG/DL (ref 8.6–10.2)
CALCIUM SERPL-MCNC: 8 MG/DL (ref 8.6–10.2)
CALCIUM SERPL-MCNC: 8.1 MG/DL (ref 8.6–10.2)
CALCIUM SERPL-MCNC: 8.2 MG/DL (ref 8.6–10.2)
CALCIUM SERPL-MCNC: 9.5 MG/DL (ref 8.6–10.2)
CALCIUM SERPL-MCNC: 9.8 MG/DL (ref 8.6–10.2)
CEA: 6.6 NG/ML (ref 0–5.2)
CHLAMYDOPHILIA PNEUMONIAE BY PCR: NOT DETECTED
CHLORIDE BLD-SCNC: 103 MMOL/L (ref 98–107)
CHLORIDE BLD-SCNC: 103 MMOL/L (ref 98–107)
CHLORIDE BLD-SCNC: 106 MMOL/L (ref 98–107)
CHLORIDE BLD-SCNC: 106 MMOL/L (ref 98–107)
CHLORIDE BLD-SCNC: 112 MMOL/L (ref 98–107)
CHLORIDE BLD-SCNC: 113 MMOL/L (ref 98–107)
CHLORIDE BLD-SCNC: 116 MMOL/L (ref 98–107)
CLARITY: ABNORMAL
CLARITY: CLEAR
CO2: 19 MMOL/L (ref 22–29)
CO2: 21 MMOL/L (ref 22–29)
CO2: 21 MMOL/L (ref 22–29)
CO2: 22 MMOL/L (ref 22–29)
CO2: 24 MMOL/L (ref 22–29)
CO2: 24 MMOL/L (ref 22–29)
CO2: 28 MMOL/L (ref 22–29)
COLOR: ABNORMAL
COLOR: YELLOW
CORONAVIRUS 229E BY PCR: NOT DETECTED
CORONAVIRUS HKU1 BY PCR: NOT DETECTED
CORONAVIRUS NL63 BY PCR: NOT DETECTED
CORONAVIRUS OC43 BY PCR: NOT DETECTED
CREAT SERPL-MCNC: 0.4 MG/DL (ref 0.5–1)
CREAT SERPL-MCNC: 0.5 MG/DL (ref 0.5–1)
CREAT SERPL-MCNC: 0.5 MG/DL (ref 0.5–1)
CREAT SERPL-MCNC: 0.6 MG/DL (ref 0.5–1)
CRYSTALS, UA: ABNORMAL /HPF
CULTURE, BLOOD 2: NORMAL
EKG ATRIAL RATE: 89 BPM
EKG P AXIS: 57 DEGREES
EKG P-R INTERVAL: 136 MS
EKG Q-T INTERVAL: 370 MS
EKG QRS DURATION: 116 MS
EKG QTC CALCULATION (BAZETT): 450 MS
EKG R AXIS: 23 DEGREES
EKG T AXIS: -100 DEGREES
EKG VENTRICULAR RATE: 89 BPM
EOSINOPHILS ABSOLUTE: 0.03 E9/L (ref 0.05–0.5)
EOSINOPHILS ABSOLUTE: 0.11 E9/L (ref 0.05–0.5)
EOSINOPHILS ABSOLUTE: 0.15 E9/L (ref 0.05–0.5)
EOSINOPHILS ABSOLUTE: 0.16 E9/L (ref 0.05–0.5)
EOSINOPHILS ABSOLUTE: 0.19 E9/L (ref 0.05–0.5)
EOSINOPHILS ABSOLUTE: 0.21 E9/L (ref 0.05–0.5)
EOSINOPHILS ABSOLUTE: 0.27 E9/L (ref 0.05–0.5)
EOSINOPHILS RELATIVE PERCENT: 0.1 % (ref 0–6)
EOSINOPHILS RELATIVE PERCENT: 0.8 % (ref 0–6)
EOSINOPHILS RELATIVE PERCENT: 0.9 % (ref 0–6)
EOSINOPHILS RELATIVE PERCENT: 1 % (ref 0–6)
EOSINOPHILS RELATIVE PERCENT: 1.3 % (ref 0–6)
EOSINOPHILS RELATIVE PERCENT: 1.4 % (ref 0–6)
EOSINOPHILS RELATIVE PERCENT: 1.6 % (ref 0–6)
EOSINOPHILS RELATIVE PERCENT: 1.7 % (ref 0–6)
EOSINOPHILS RELATIVE PERCENT: 2.1 % (ref 0–6)
GENITAL CULTURE, ROUTINE: NORMAL
GFR AFRICAN AMERICAN: >60
GFR NON-AFRICAN AMERICAN: >60 ML/MIN/1.73
GLUCOSE BLD-MCNC: 105 MG/DL (ref 74–99)
GLUCOSE BLD-MCNC: 112 MG/DL (ref 74–99)
GLUCOSE BLD-MCNC: 114 MG/DL (ref 74–99)
GLUCOSE BLD-MCNC: 88 MG/DL (ref 74–99)
GLUCOSE BLD-MCNC: 92 MG/DL (ref 74–99)
GLUCOSE BLD-MCNC: 93 MG/DL (ref 74–99)
GLUCOSE BLD-MCNC: 97 MG/DL (ref 74–99)
GLUCOSE URINE: NEGATIVE MG/DL
GLUCOSE URINE: NEGATIVE MG/DL
GRAM STAIN ORDERABLE: NORMAL
HCT VFR BLD CALC: 28.6 % (ref 34–48)
HCT VFR BLD CALC: 29.9 % (ref 34–48)
HCT VFR BLD CALC: 30.2 % (ref 34–48)
HCT VFR BLD CALC: 30.2 % (ref 34–48)
HCT VFR BLD CALC: 30.4 % (ref 34–48)
HCT VFR BLD CALC: 31.9 % (ref 34–48)
HCT VFR BLD CALC: 33.8 % (ref 34–48)
HCT VFR BLD CALC: 38 % (ref 34–48)
HCT VFR BLD CALC: 40.9 % (ref 34–48)
HEMOGLOBIN: 10.1 G/DL (ref 11.5–15.5)
HEMOGLOBIN: 10.4 G/DL (ref 11.5–15.5)
HEMOGLOBIN: 11.9 G/DL (ref 11.5–15.5)
HEMOGLOBIN: 12.8 G/DL (ref 11.5–15.5)
HEMOGLOBIN: 8.9 G/DL (ref 11.5–15.5)
HEMOGLOBIN: 9.4 G/DL (ref 11.5–15.5)
HEMOGLOBIN: 9.5 G/DL (ref 11.5–15.5)
HEMOGLOBIN: 9.6 G/DL (ref 11.5–15.5)
HEMOGLOBIN: 9.6 G/DL (ref 11.5–15.5)
HUMAN METAPNEUMOVIRUS BY PCR: NOT DETECTED
HUMAN RHINOVIRUS/ENTEROVIRUS BY PCR: NOT DETECTED
HYALINE CASTS: ABNORMAL /LPF (ref 0–2)
IMMATURE GRANULOCYTES #: 0.04 E9/L
IMMATURE GRANULOCYTES #: 0.08 E9/L
IMMATURE GRANULOCYTES #: 0.09 E9/L
IMMATURE GRANULOCYTES #: 0.1 E9/L
IMMATURE GRANULOCYTES #: 0.11 E9/L
IMMATURE GRANULOCYTES #: 0.13 E9/L
IMMATURE GRANULOCYTES #: 0.13 E9/L
IMMATURE GRANULOCYTES #: 0.16 E9/L
IMMATURE GRANULOCYTES %: 0.4 % (ref 0–5)
IMMATURE GRANULOCYTES %: 0.6 % (ref 0–5)
IMMATURE GRANULOCYTES %: 0.6 % (ref 0–5)
IMMATURE GRANULOCYTES %: 0.7 % (ref 0–5)
IMMATURE GRANULOCYTES %: 0.8 % (ref 0–5)
IMMATURE GRANULOCYTES %: 0.9 % (ref 0–5)
INFLUENZA A BY PCR: NOT DETECTED
INFLUENZA B BY PCR: NOT DETECTED
KETONES, URINE: ABNORMAL MG/DL
KETONES, URINE: NEGATIVE MG/DL
LACTIC ACID: 1.1 MMOL/L (ref 0.5–2.2)
LACTIC ACID: 2.2 MMOL/L (ref 0.5–2.2)
LEUKOCYTE ESTERASE, URINE: ABNORMAL
LEUKOCYTE ESTERASE, URINE: NEGATIVE
LIPASE: 31 U/L (ref 13–60)
LYMPHOCYTES ABSOLUTE: 1.1 E9/L (ref 1.5–4)
LYMPHOCYTES ABSOLUTE: 1.29 E9/L (ref 1.5–4)
LYMPHOCYTES ABSOLUTE: 1.4 E9/L (ref 1.5–4)
LYMPHOCYTES ABSOLUTE: 1.53 E9/L (ref 1.5–4)
LYMPHOCYTES ABSOLUTE: 1.54 E9/L (ref 1.5–4)
LYMPHOCYTES ABSOLUTE: 1.6 E9/L (ref 1.5–4)
LYMPHOCYTES ABSOLUTE: 1.94 E9/L (ref 1.5–4)
LYMPHOCYTES ABSOLUTE: 1.96 E9/L (ref 1.5–4)
LYMPHOCYTES ABSOLUTE: 2.58 E9/L (ref 1.5–4)
LYMPHOCYTES RELATIVE PERCENT: 10.8 % (ref 20–42)
LYMPHOCYTES RELATIVE PERCENT: 11 % (ref 20–42)
LYMPHOCYTES RELATIVE PERCENT: 12.1 % (ref 20–42)
LYMPHOCYTES RELATIVE PERCENT: 13 % (ref 20–42)
LYMPHOCYTES RELATIVE PERCENT: 25.6 % (ref 20–42)
LYMPHOCYTES RELATIVE PERCENT: 7 % (ref 20–42)
LYMPHOCYTES RELATIVE PERCENT: 7 % (ref 20–42)
LYMPHOCYTES RELATIVE PERCENT: 8.6 % (ref 20–42)
LYMPHOCYTES RELATIVE PERCENT: 9.7 % (ref 20–42)
MAGNESIUM: 1.7 MG/DL (ref 1.6–2.6)
MCH RBC QN AUTO: 27.5 PG (ref 26–35)
MCH RBC QN AUTO: 27.6 PG (ref 26–35)
MCH RBC QN AUTO: 27.6 PG (ref 26–35)
MCH RBC QN AUTO: 27.7 PG (ref 26–35)
MCH RBC QN AUTO: 27.7 PG (ref 26–35)
MCH RBC QN AUTO: 27.9 PG (ref 26–35)
MCH RBC QN AUTO: 27.9 PG (ref 26–35)
MCH RBC QN AUTO: 28 PG (ref 26–35)
MCH RBC QN AUTO: 28.3 PG (ref 26–35)
MCHC RBC AUTO-ENTMCNC: 30.8 % (ref 32–34.5)
MCHC RBC AUTO-ENTMCNC: 31.1 % (ref 32–34.5)
MCHC RBC AUTO-ENTMCNC: 31.3 % (ref 32–34.5)
MCHC RBC AUTO-ENTMCNC: 31.3 % (ref 32–34.5)
MCHC RBC AUTO-ENTMCNC: 31.4 % (ref 32–34.5)
MCHC RBC AUTO-ENTMCNC: 31.5 % (ref 32–34.5)
MCHC RBC AUTO-ENTMCNC: 31.6 % (ref 32–34.5)
MCHC RBC AUTO-ENTMCNC: 31.7 % (ref 32–34.5)
MCHC RBC AUTO-ENTMCNC: 31.8 % (ref 32–34.5)
MCV RBC AUTO: 87.4 FL (ref 80–99.9)
MCV RBC AUTO: 87.5 FL (ref 80–99.9)
MCV RBC AUTO: 87.8 FL (ref 80–99.9)
MCV RBC AUTO: 88.4 FL (ref 80–99.9)
MCV RBC AUTO: 88.5 FL (ref 80–99.9)
MCV RBC AUTO: 88.6 FL (ref 80–99.9)
MCV RBC AUTO: 88.7 FL (ref 80–99.9)
MCV RBC AUTO: 90.1 FL (ref 80–99.9)
MCV RBC AUTO: 90.3 FL (ref 80–99.9)
MONOCYTES ABSOLUTE: 1.25 E9/L (ref 0.1–0.95)
MONOCYTES ABSOLUTE: 1.43 E9/L (ref 0.1–0.95)
MONOCYTES ABSOLUTE: 1.51 E9/L (ref 0.1–0.95)
MONOCYTES ABSOLUTE: 1.73 E9/L (ref 0.1–0.95)
MONOCYTES ABSOLUTE: 1.84 E9/L (ref 0.1–0.95)
MONOCYTES ABSOLUTE: 1.94 E9/L (ref 0.1–0.95)
MONOCYTES ABSOLUTE: 2.05 E9/L (ref 0.1–0.95)
MONOCYTES RELATIVE PERCENT: 11 % (ref 2–12)
MONOCYTES RELATIVE PERCENT: 11.9 % (ref 2–12)
MONOCYTES RELATIVE PERCENT: 12.2 % (ref 2–12)
MONOCYTES RELATIVE PERCENT: 12.4 % (ref 2–12)
MONOCYTES RELATIVE PERCENT: 12.8 % (ref 2–12)
MONOCYTES RELATIVE PERCENT: 8.8 % (ref 2–12)
MONOCYTES RELATIVE PERCENT: 9 % (ref 2–12)
MONOCYTES RELATIVE PERCENT: 9.3 % (ref 2–12)
MONOCYTES RELATIVE PERCENT: 9.8 % (ref 2–12)
MUCUS: PRESENT /LPF
MYCOPLASMA PNEUMONIAE BY PCR: NOT DETECTED
NEUTROPHILS ABSOLUTE: 10.89 E9/L (ref 1.8–7.3)
NEUTROPHILS ABSOLUTE: 11.22 E9/L (ref 1.8–7.3)
NEUTROPHILS ABSOLUTE: 11.61 E9/L (ref 1.8–7.3)
NEUTROPHILS ABSOLUTE: 12.53 E9/L (ref 1.8–7.3)
NEUTROPHILS ABSOLUTE: 12.72 E9/L (ref 1.8–7.3)
NEUTROPHILS ABSOLUTE: 12.91 E9/L (ref 1.8–7.3)
NEUTROPHILS ABSOLUTE: 18.21 E9/L (ref 1.8–7.3)
NEUTROPHILS ABSOLUTE: 5.96 E9/L (ref 1.8–7.3)
NEUTROPHILS ABSOLUTE: 8.95 E9/L (ref 1.8–7.3)
NEUTROPHILS RELATIVE PERCENT: 59.1 % (ref 43–80)
NEUTROPHILS RELATIVE PERCENT: 72.5 % (ref 43–80)
NEUTROPHILS RELATIVE PERCENT: 72.5 % (ref 43–80)
NEUTROPHILS RELATIVE PERCENT: 74.7 % (ref 43–80)
NEUTROPHILS RELATIVE PERCENT: 77.2 % (ref 43–80)
NEUTROPHILS RELATIVE PERCENT: 79.1 % (ref 43–80)
NEUTROPHILS RELATIVE PERCENT: 79.7 % (ref 43–80)
NEUTROPHILS RELATIVE PERCENT: 81 % (ref 43–80)
NEUTROPHILS RELATIVE PERCENT: 83 % (ref 43–80)
NITRITE, URINE: NEGATIVE
NITRITE, URINE: NEGATIVE
ORGANISM: ABNORMAL
OVALOCYTES: ABNORMAL
OVALOCYTES: ABNORMAL
PARAINFLUENZA VIRUS 1 BY PCR: NOT DETECTED
PARAINFLUENZA VIRUS 2 BY PCR: NOT DETECTED
PARAINFLUENZA VIRUS 3 BY PCR: NOT DETECTED
PARAINFLUENZA VIRUS 4 BY PCR: NOT DETECTED
PDW BLD-RTO: 13 FL (ref 11.5–15)
PDW BLD-RTO: 14.3 FL (ref 11.5–15)
PDW BLD-RTO: 14.3 FL (ref 11.5–15)
PDW BLD-RTO: 14.5 FL (ref 11.5–15)
PDW BLD-RTO: 14.7 FL (ref 11.5–15)
PDW BLD-RTO: 15.1 FL (ref 11.5–15)
PDW BLD-RTO: 15.6 FL (ref 11.5–15)
PDW BLD-RTO: 15.9 FL (ref 11.5–15)
PDW BLD-RTO: 16.7 FL (ref 11.5–15)
PH UA: 7 (ref 5–9)
PH UA: 7.5 (ref 5–9)
PHOSPHORUS: 2.1 MG/DL (ref 2.5–4.5)
PLATELET # BLD: 299 E9/L (ref 130–450)
PLATELET # BLD: 302 E9/L (ref 130–450)
PLATELET # BLD: 310 E9/L (ref 130–450)
PLATELET # BLD: 326 E9/L (ref 130–450)
PLATELET # BLD: 337 E9/L (ref 130–450)
PLATELET # BLD: 350 E9/L (ref 130–450)
PLATELET # BLD: 358 E9/L (ref 130–450)
PLATELET # BLD: 397 E9/L (ref 130–450)
PLATELET # BLD: 442 E9/L (ref 130–450)
PMV BLD AUTO: 10.2 FL (ref 7–12)
PMV BLD AUTO: 10.3 FL (ref 7–12)
PMV BLD AUTO: 10.6 FL (ref 7–12)
PMV BLD AUTO: 10.6 FL (ref 7–12)
PMV BLD AUTO: 10.8 FL (ref 7–12)
PMV BLD AUTO: 10.8 FL (ref 7–12)
PMV BLD AUTO: 10.9 FL (ref 7–12)
PMV BLD AUTO: 11.2 FL (ref 7–12)
PMV BLD AUTO: 11.2 FL (ref 7–12)
POIKILOCYTES: ABNORMAL
POLYCHROMASIA: ABNORMAL
POTASSIUM REFLEX MAGNESIUM: 3.7 MMOL/L (ref 3.5–5)
POTASSIUM REFLEX MAGNESIUM: 3.9 MMOL/L (ref 3.5–5)
POTASSIUM REFLEX MAGNESIUM: 4.2 MMOL/L (ref 3.5–5)
POTASSIUM REFLEX MAGNESIUM: 4.6 MMOL/L (ref 3.5–5)
POTASSIUM SERPL-SCNC: 2.9 MMOL/L (ref 3.5–5)
POTASSIUM SERPL-SCNC: 3.3 MMOL/L (ref 3.5–5)
POTASSIUM SERPL-SCNC: 3.4 MMOL/L (ref 3.5–5)
PRO-BNP: 819 PG/ML (ref 0–125)
PROTEIN UA: ABNORMAL MG/DL
PROTEIN UA: NEGATIVE MG/DL
RBC # BLD: 3.23 E12/L (ref 3.5–5.5)
RBC # BLD: 3.37 E12/L (ref 3.5–5.5)
RBC # BLD: 3.44 E12/L (ref 3.5–5.5)
RBC # BLD: 3.45 E12/L (ref 3.5–5.5)
RBC # BLD: 3.48 E12/L (ref 3.5–5.5)
RBC # BLD: 3.61 E12/L (ref 3.5–5.5)
RBC # BLD: 3.75 E12/L (ref 3.5–5.5)
RBC # BLD: 4.29 E12/L (ref 3.5–5.5)
RBC # BLD: 4.53 E12/L (ref 3.5–5.5)
RBC UA: ABNORMAL /HPF (ref 0–2)
RBC UA: NORMAL /HPF (ref 0–2)
REASON FOR REJECTION: NORMAL
REASON FOR REJECTION: NORMAL
REJECTED TEST: NORMAL
REJECTED TEST: NORMAL
RESPIRATORY SYNCYTIAL VIRUS BY PCR: NOT DETECTED
SARS-COV-2, NAAT: NOT DETECTED
SARS-COV-2, NAAT: NOT DETECTED
SEDIMENTATION RATE, ERYTHROCYTE: 57 MM/HR (ref 0–20)
SODIUM BLD-SCNC: 136 MMOL/L (ref 132–146)
SODIUM BLD-SCNC: 136 MMOL/L (ref 132–146)
SODIUM BLD-SCNC: 138 MMOL/L (ref 132–146)
SODIUM BLD-SCNC: 141 MMOL/L (ref 132–146)
SODIUM BLD-SCNC: 141 MMOL/L (ref 132–146)
SODIUM BLD-SCNC: 143 MMOL/L (ref 132–146)
SODIUM BLD-SCNC: 145 MMOL/L (ref 132–146)
SPECIFIC GRAVITY UA: 1.02 (ref 1–1.03)
SPECIFIC GRAVITY UA: 1.02 (ref 1–1.03)
TOTAL PROTEIN: 5.4 G/DL (ref 6.4–8.3)
TOTAL PROTEIN: 6 G/DL (ref 6.4–8.3)
TOTAL PROTEIN: 7.6 G/DL (ref 6.4–8.3)
TOTAL PROTEIN: 8.1 G/DL (ref 6.4–8.3)
TROPONIN: 0.02 NG/ML (ref 0–0.03)
URINE CULTURE, ROUTINE: NORMAL
UROBILINOGEN, URINE: 0.2 E.U./DL
UROBILINOGEN, URINE: 2 E.U./DL
WBC # BLD: 10.1 E9/L (ref 4.5–11.5)
WBC # BLD: 12 E9/L (ref 4.5–11.5)
WBC # BLD: 14.5 E9/L (ref 4.5–11.5)
WBC # BLD: 15 E9/L (ref 4.5–11.5)
WBC # BLD: 15.7 E9/L (ref 4.5–11.5)
WBC # BLD: 15.9 E9/L (ref 4.5–11.5)
WBC # BLD: 16 E9/L (ref 4.5–11.5)
WBC # BLD: 16.2 E9/L (ref 4.5–11.5)
WBC # BLD: 22 E9/L (ref 4.5–11.5)
WBC UA: ABNORMAL /HPF (ref 0–5)
WBC UA: NORMAL /HPF (ref 0–5)
WOUND/ABSCESS: ABNORMAL

## 2020-01-01 PROCEDURE — 6360000002 HC RX W HCPCS: Performed by: INTERNAL MEDICINE

## 2020-01-01 PROCEDURE — 86060 ANTISTREPTOLYSIN O TITER: CPT

## 2020-01-01 PROCEDURE — 7100000011 HC PHASE II RECOVERY - ADDTL 15 MIN: Performed by: SURGERY

## 2020-01-01 PROCEDURE — 6360000002 HC RX W HCPCS: Performed by: SPECIALIST

## 2020-01-01 PROCEDURE — 87075 CULTR BACTERIA EXCEPT BLOOD: CPT

## 2020-01-01 PROCEDURE — 80053 COMPREHEN METABOLIC PANEL: CPT

## 2020-01-01 PROCEDURE — 1200000000 HC SEMI PRIVATE

## 2020-01-01 PROCEDURE — 2500000003 HC RX 250 WO HCPCS: Performed by: SPECIALIST

## 2020-01-01 PROCEDURE — 99221 1ST HOSP IP/OBS SF/LOW 40: CPT | Performed by: NURSE PRACTITIONER

## 2020-01-01 PROCEDURE — C1751 CATH, INF, PER/CENT/MIDLINE: HCPCS

## 2020-01-01 PROCEDURE — 3609017100 HC EGD: Performed by: SURGERY

## 2020-01-01 PROCEDURE — 2580000003 HC RX 258: Performed by: SPECIALIST

## 2020-01-01 PROCEDURE — 3700000001 HC ADD 15 MINUTES (ANESTHESIA): Performed by: SURGERY

## 2020-01-01 PROCEDURE — 85025 COMPLETE CBC W/AUTO DIFF WBC: CPT

## 2020-01-01 PROCEDURE — 84100 ASSAY OF PHOSPHORUS: CPT

## 2020-01-01 PROCEDURE — 87077 CULTURE AEROBIC IDENTIFY: CPT

## 2020-01-01 PROCEDURE — 6370000000 HC RX 637 (ALT 250 FOR IP): Performed by: INTERNAL MEDICINE

## 2020-01-01 PROCEDURE — 2580000003 HC RX 258: Performed by: INTERNAL MEDICINE

## 2020-01-01 PROCEDURE — 76856 US EXAM PELVIC COMPLETE: CPT

## 2020-01-01 PROCEDURE — 36415 COLL VENOUS BLD VENIPUNCTURE: CPT

## 2020-01-01 PROCEDURE — 84484 ASSAY OF TROPONIN QUANT: CPT

## 2020-01-01 PROCEDURE — 99285 EMERGENCY DEPT VISIT HI MDM: CPT

## 2020-01-01 PROCEDURE — 80048 BASIC METABOLIC PNL TOTAL CA: CPT

## 2020-01-01 PROCEDURE — B548ZZA ULTRASONOGRAPHY OF SUPERIOR VENA CAVA, GUIDANCE: ICD-10-PCS | Performed by: INTERNAL MEDICINE

## 2020-01-01 PROCEDURE — 86140 C-REACTIVE PROTEIN: CPT

## 2020-01-01 PROCEDURE — 71045 X-RAY EXAM CHEST 1 VIEW: CPT

## 2020-01-01 PROCEDURE — 85651 RBC SED RATE NONAUTOMATED: CPT

## 2020-01-01 PROCEDURE — 71260 CT THORAX DX C+: CPT

## 2020-01-01 PROCEDURE — 99233 SBSQ HOSP IP/OBS HIGH 50: CPT | Performed by: INTERNAL MEDICINE

## 2020-01-01 PROCEDURE — 83605 ASSAY OF LACTIC ACID: CPT

## 2020-01-01 PROCEDURE — 02HV33Z INSERTION OF INFUSION DEVICE INTO SUPERIOR VENA CAVA, PERCUTANEOUS APPROACH: ICD-10-PCS | Performed by: INTERNAL MEDICINE

## 2020-01-01 PROCEDURE — 81001 URINALYSIS AUTO W/SCOPE: CPT

## 2020-01-01 PROCEDURE — 6360000004 HC RX CONTRAST MEDICATION: Performed by: RADIOLOGY

## 2020-01-01 PROCEDURE — 0DJ08ZZ INSPECTION OF UPPER INTESTINAL TRACT, VIA NATURAL OR ARTIFICIAL OPENING ENDOSCOPIC: ICD-10-PCS | Performed by: SURGERY

## 2020-01-01 PROCEDURE — 83690 ASSAY OF LIPASE: CPT

## 2020-01-01 PROCEDURE — 87040 BLOOD CULTURE FOR BACTERIA: CPT

## 2020-01-01 PROCEDURE — 99223 1ST HOSP IP/OBS HIGH 75: CPT | Performed by: INTERNAL MEDICINE

## 2020-01-01 PROCEDURE — 2580000003 HC RX 258: Performed by: STUDENT IN AN ORGANIZED HEALTH CARE EDUCATION/TRAINING PROGRAM

## 2020-01-01 PROCEDURE — 2709999900 HC NON-CHARGEABLE SUPPLY: Performed by: SURGERY

## 2020-01-01 PROCEDURE — 82378 CARCINOEMBRYONIC ANTIGEN: CPT

## 2020-01-01 PROCEDURE — 6360000002 HC RX W HCPCS: Performed by: NURSE ANESTHETIST, CERTIFIED REGISTERED

## 2020-01-01 PROCEDURE — 99232 SBSQ HOSP IP/OBS MODERATE 35: CPT | Performed by: INTERNAL MEDICINE

## 2020-01-01 PROCEDURE — 36569 INSJ PICC 5 YR+ W/O IMAGING: CPT

## 2020-01-01 PROCEDURE — 76937 US GUIDE VASCULAR ACCESS: CPT

## 2020-01-01 PROCEDURE — 87070 CULTURE OTHR SPECIMN AEROBIC: CPT

## 2020-01-01 PROCEDURE — 7100000010 HC PHASE II RECOVERY - FIRST 15 MIN: Performed by: SURGERY

## 2020-01-01 PROCEDURE — 87088 URINE BACTERIA CULTURE: CPT

## 2020-01-01 PROCEDURE — 74177 CT ABD & PELVIS W/CONTRAST: CPT

## 2020-01-01 PROCEDURE — U0003 INFECTIOUS AGENT DETECTION BY NUCLEIC ACID (DNA OR RNA); SEVERE ACUTE RESPIRATORY SYNDROME CORONAVIRUS 2 (SARS-COV-2) (CORONAVIRUS DISEASE [COVID-19]), AMPLIFIED PROBE TECHNIQUE, MAKING USE OF HIGH THROUGHPUT TECHNOLOGIES AS DESCRIBED BY CMS-2020-01-R: HCPCS

## 2020-01-01 PROCEDURE — 3700000000 HC ANESTHESIA ATTENDED CARE: Performed by: SURGERY

## 2020-01-01 PROCEDURE — 0100U HC RESPIRPTHGN MULT REV TRANS & AMP PRB TECH 21 TRGT: CPT

## 2020-01-01 PROCEDURE — 93010 ELECTROCARDIOGRAM REPORT: CPT | Performed by: INTERNAL MEDICINE

## 2020-01-01 PROCEDURE — 83735 ASSAY OF MAGNESIUM: CPT

## 2020-01-01 PROCEDURE — 93005 ELECTROCARDIOGRAM TRACING: CPT | Performed by: GENERAL PRACTICE

## 2020-01-01 PROCEDURE — U0002 COVID-19 LAB TEST NON-CDC: HCPCS

## 2020-01-01 PROCEDURE — 6360000002 HC RX W HCPCS: Performed by: GENERAL PRACTICE

## 2020-01-01 PROCEDURE — 87076 CULTURE ANAEROBE IDENT EACH: CPT

## 2020-01-01 PROCEDURE — 99999 PR OFFICE/OUTPT VISIT,PROCEDURE ONLY: CPT | Performed by: INTERNAL MEDICINE

## 2020-01-01 PROCEDURE — 2580000003 HC RX 258: Performed by: NURSE ANESTHETIST, CERTIFIED REGISTERED

## 2020-01-01 PROCEDURE — 86304 IMMUNOASSAY TUMOR CA 125: CPT

## 2020-01-01 PROCEDURE — 99284 EMERGENCY DEPT VISIT MOD MDM: CPT

## 2020-01-01 PROCEDURE — 83880 ASSAY OF NATRIURETIC PEPTIDE: CPT

## 2020-01-01 PROCEDURE — 99239 HOSP IP/OBS DSCHRG MGMT >30: CPT | Performed by: INTERNAL MEDICINE

## 2020-01-01 PROCEDURE — 87186 SC STD MICRODIL/AGAR DIL: CPT

## 2020-01-01 PROCEDURE — 99214 OFFICE O/P EST MOD 30 MIN: CPT | Performed by: CLINICAL NURSE SPECIALIST

## 2020-01-01 PROCEDURE — 2580000003 HC RX 258: Performed by: GENERAL PRACTICE

## 2020-01-01 PROCEDURE — 87205 SMEAR GRAM STAIN: CPT

## 2020-01-01 PROCEDURE — 6360000002 HC RX W HCPCS

## 2020-01-01 RX ORDER — M-VIT,TX,IRON,MINS/CALC/FOLIC 27MG-0.4MG
1 TABLET ORAL DAILY
Status: DISCONTINUED | OUTPATIENT
Start: 2020-01-01 | End: 2020-01-01 | Stop reason: HOSPADM

## 2020-01-01 RX ORDER — SODIUM CHLORIDE 0.9 % (FLUSH) 0.9 %
10 SYRINGE (ML) INJECTION EVERY 12 HOURS SCHEDULED
Status: DISCONTINUED | OUTPATIENT
Start: 2020-01-01 | End: 2020-01-01 | Stop reason: HOSPADM

## 2020-01-01 RX ORDER — MORPHINE SULFATE 2 MG/ML
INJECTION, SOLUTION INTRAMUSCULAR; INTRAVENOUS
Status: COMPLETED
Start: 2020-01-01 | End: 2020-01-01

## 2020-01-01 RX ORDER — DOCUSATE SODIUM 100 MG/1
100 CAPSULE, LIQUID FILLED ORAL NIGHTLY
Status: DISCONTINUED | OUTPATIENT
Start: 2020-01-01 | End: 2020-01-01 | Stop reason: HOSPADM

## 2020-01-01 RX ORDER — POTASSIUM CHLORIDE 7.45 MG/ML
10 INJECTION INTRAVENOUS
Status: COMPLETED | OUTPATIENT
Start: 2020-01-01 | End: 2020-01-01

## 2020-01-01 RX ORDER — LEVETIRACETAM 500 MG/1
1500 TABLET ORAL 2 TIMES DAILY
Status: DISCONTINUED | OUTPATIENT
Start: 2020-01-01 | End: 2020-01-01

## 2020-01-01 RX ORDER — PROMETHAZINE HYDROCHLORIDE 25 MG/1
12.5 TABLET ORAL EVERY 6 HOURS PRN
Status: DISCONTINUED | OUTPATIENT
Start: 2020-01-01 | End: 2020-01-01 | Stop reason: HOSPADM

## 2020-01-01 RX ORDER — DEXTROSE AND SODIUM CHLORIDE 5; .45 G/100ML; G/100ML
INJECTION, SOLUTION INTRAVENOUS CONTINUOUS
Status: DISCONTINUED | OUTPATIENT
Start: 2020-01-01 | End: 2020-01-01 | Stop reason: HOSPADM

## 2020-01-01 RX ORDER — MEGESTROL ACETATE 40 MG/1
40 TABLET ORAL DAILY
Qty: 14 TABLET | Refills: 0 | Status: ON HOLD | OUTPATIENT
Start: 2020-01-01 | End: 2020-01-01

## 2020-01-01 RX ORDER — PROPOFOL 10 MG/ML
INJECTION, EMULSION INTRAVENOUS PRN
Status: DISCONTINUED | OUTPATIENT
Start: 2020-01-01 | End: 2020-01-01 | Stop reason: SDUPTHER

## 2020-01-01 RX ORDER — POLYETHYLENE GLYCOL 3350 17 G/17G
17 POWDER, FOR SOLUTION ORAL DAILY
Status: DISCONTINUED | OUTPATIENT
Start: 2020-01-01 | End: 2020-01-01 | Stop reason: HOSPADM

## 2020-01-01 RX ORDER — SODIUM CHLORIDE 9 MG/ML
INJECTION, SOLUTION INTRAVENOUS CONTINUOUS
Status: DISCONTINUED | OUTPATIENT
Start: 2020-01-01 | End: 2020-01-01

## 2020-01-01 RX ORDER — SODIUM CHLORIDE 0.9 % (FLUSH) 0.9 %
10 SYRINGE (ML) INJECTION PRN
Status: DISCONTINUED | OUTPATIENT
Start: 2020-01-01 | End: 2020-01-01 | Stop reason: HOSPADM

## 2020-01-01 RX ORDER — HEPARIN SODIUM (PORCINE) LOCK FLUSH IV SOLN 100 UNIT/ML 100 UNIT/ML
3 SOLUTION INTRAVENOUS PRN
Status: DISCONTINUED | OUTPATIENT
Start: 2020-01-01 | End: 2020-01-01 | Stop reason: HOSPADM

## 2020-01-01 RX ORDER — SODIUM CHLORIDE 9 MG/ML
25 INJECTION, SOLUTION INTRAVENOUS EVERY 8 HOURS
Status: DISCONTINUED | OUTPATIENT
Start: 2020-01-01 | End: 2020-01-01 | Stop reason: ALTCHOICE

## 2020-01-01 RX ORDER — GUAIFENESIN/DEXTROMETHORPHAN 100-10MG/5
5 SYRUP ORAL 3 TIMES DAILY PRN
Status: DISCONTINUED | OUTPATIENT
Start: 2020-01-01 | End: 2020-01-01 | Stop reason: HOSPADM

## 2020-01-01 RX ORDER — MORPHINE SULFATE 2 MG/ML
2 INJECTION, SOLUTION INTRAMUSCULAR; INTRAVENOUS ONCE
Status: COMPLETED | OUTPATIENT
Start: 2020-01-01 | End: 2020-01-01

## 2020-01-01 RX ORDER — 0.9 % SODIUM CHLORIDE 0.9 %
500 INTRAVENOUS SOLUTION INTRAVENOUS ONCE
Status: COMPLETED | OUTPATIENT
Start: 2020-01-01 | End: 2020-01-01

## 2020-01-01 RX ORDER — SENNA PLUS 8.6 MG/1
1 TABLET ORAL NIGHTLY
Status: DISCONTINUED | OUTPATIENT
Start: 2020-01-01 | End: 2020-01-01 | Stop reason: HOSPADM

## 2020-01-01 RX ORDER — HEPARIN SODIUM (PORCINE) LOCK FLUSH IV SOLN 100 UNIT/ML 100 UNIT/ML
3 SOLUTION INTRAVENOUS EVERY 12 HOURS SCHEDULED
Status: DISCONTINUED | OUTPATIENT
Start: 2020-01-01 | End: 2020-01-01 | Stop reason: HOSPADM

## 2020-01-01 RX ORDER — CALCIUM POLYCARBOPHIL 625 MG 625 MG/1
625 TABLET ORAL 2 TIMES DAILY
COMMUNITY

## 2020-01-01 RX ORDER — MEGESTROL ACETATE 40 MG/1
40 TABLET ORAL DAILY
Status: DISCONTINUED | OUTPATIENT
Start: 2020-01-01 | End: 2020-01-01

## 2020-01-01 RX ORDER — LIDOCAINE HYDROCHLORIDE 10 MG/ML
5 INJECTION, SOLUTION EPIDURAL; INFILTRATION; INTRACAUDAL; PERINEURAL ONCE
Status: COMPLETED | OUTPATIENT
Start: 2020-01-01 | End: 2020-01-01

## 2020-01-01 RX ORDER — ACETAMINOPHEN 325 MG/1
650 TABLET ORAL EVERY 6 HOURS PRN
Status: DISCONTINUED | OUTPATIENT
Start: 2020-01-01 | End: 2020-01-01 | Stop reason: HOSPADM

## 2020-01-01 RX ORDER — HYDROCODONE BITARTRATE AND ACETAMINOPHEN 5; 325 MG/1; MG/1
1 TABLET ORAL EVERY 6 HOURS PRN
COMMUNITY

## 2020-01-01 RX ORDER — SODIUM CHLORIDE 0.9 % (FLUSH) 0.9 %
10 SYRINGE (ML) INJECTION PRN
Status: DISCONTINUED | OUTPATIENT
Start: 2020-01-01 | End: 2020-01-01 | Stop reason: SDUPTHER

## 2020-01-01 RX ORDER — MORPHINE SULFATE 2 MG/ML
1 INJECTION, SOLUTION INTRAMUSCULAR; INTRAVENOUS EVERY 4 HOURS PRN
Status: DISCONTINUED | OUTPATIENT
Start: 2020-01-01 | End: 2020-01-01 | Stop reason: HOSPADM

## 2020-01-01 RX ORDER — 0.9 % SODIUM CHLORIDE 0.9 %
1000 INTRAVENOUS SOLUTION INTRAVENOUS ONCE
Status: COMPLETED | OUTPATIENT
Start: 2020-01-01 | End: 2020-01-01

## 2020-01-01 RX ORDER — ACETAMINOPHEN 325 MG/1
650 TABLET ORAL EVERY 6 HOURS PRN
Status: DISCONTINUED | OUTPATIENT
Start: 2020-01-01 | End: 2020-01-01 | Stop reason: SDUPTHER

## 2020-01-01 RX ORDER — DRONABINOL 2.5 MG/1
2.5 CAPSULE ORAL 2 TIMES DAILY
Status: DISCONTINUED | OUTPATIENT
Start: 2020-01-01 | End: 2020-01-01 | Stop reason: HOSPADM

## 2020-01-01 RX ORDER — POLYETHYLENE GLYCOL 3350 17 G/17G
17 POWDER, FOR SOLUTION ORAL DAILY PRN
Status: DISCONTINUED | OUTPATIENT
Start: 2020-01-01 | End: 2020-01-01 | Stop reason: HOSPADM

## 2020-01-01 RX ORDER — LUBIPROSTONE 24 UG/1
24 CAPSULE, GELATIN COATED ORAL 2 TIMES DAILY WITH MEALS
Status: DISCONTINUED | OUTPATIENT
Start: 2020-01-01 | End: 2020-01-01 | Stop reason: HOSPADM

## 2020-01-01 RX ORDER — BISACODYL 10 MG
10 SUPPOSITORY, RECTAL RECTAL ONCE
Status: COMPLETED | OUTPATIENT
Start: 2020-01-01 | End: 2020-01-01

## 2020-01-01 RX ORDER — ACETAMINOPHEN 650 MG/1
650 SUPPOSITORY RECTAL EVERY 6 HOURS PRN
Status: DISCONTINUED | OUTPATIENT
Start: 2020-01-01 | End: 2020-01-01 | Stop reason: HOSPADM

## 2020-01-01 RX ORDER — ONDANSETRON 2 MG/ML
4 INJECTION INTRAMUSCULAR; INTRAVENOUS EVERY 6 HOURS PRN
Status: DISCONTINUED | OUTPATIENT
Start: 2020-01-01 | End: 2020-01-01 | Stop reason: HOSPADM

## 2020-01-01 RX ORDER — SODIUM CHLORIDE 9 MG/ML
INJECTION, SOLUTION INTRAVENOUS CONTINUOUS PRN
Status: DISCONTINUED | OUTPATIENT
Start: 2020-01-01 | End: 2020-01-01 | Stop reason: SDUPTHER

## 2020-01-01 RX ORDER — HYDROCODONE BITARTRATE AND ACETAMINOPHEN 5; 325 MG/1; MG/1
1 TABLET ORAL EVERY 6 HOURS PRN
Status: DISCONTINUED | OUTPATIENT
Start: 2020-01-01 | End: 2020-01-01 | Stop reason: HOSPADM

## 2020-01-01 RX ORDER — LACOSAMIDE 100 MG/1
150 TABLET ORAL 2 TIMES DAILY
Status: DISCONTINUED | OUTPATIENT
Start: 2020-01-01 | End: 2020-01-01 | Stop reason: HOSPADM

## 2020-01-01 RX ORDER — DRONABINOL 2.5 MG/1
2.5 CAPSULE ORAL 2 TIMES DAILY
Qty: 60 CAPSULE | Refills: 0 | Status: SHIPPED | OUTPATIENT
Start: 2020-01-01 | End: 2020-07-10

## 2020-01-01 RX ORDER — LACOSAMIDE 150 MG/1
150 TABLET ORAL 2 TIMES DAILY
Qty: 62 TABLET | Refills: 5 | Status: SHIPPED | OUTPATIENT
Start: 2020-01-01 | End: 2020-12-01

## 2020-01-01 RX ADMIN — STANDARDIZED SENNA CONCENTRATE 8.6 MG: 8.6 TABLET ORAL at 21:07

## 2020-01-01 RX ADMIN — DEXTROSE AND SODIUM CHLORIDE: 5; 450 INJECTION, SOLUTION INTRAVENOUS at 02:30

## 2020-01-01 RX ADMIN — LEVETIRACETAM 1500 MG: 100 INJECTION, SOLUTION INTRAVENOUS at 01:48

## 2020-01-01 RX ADMIN — STANDARDIZED SENNA CONCENTRATE 8.6 MG: 8.6 TABLET ORAL at 20:43

## 2020-01-01 RX ADMIN — POTASSIUM CHLORIDE 10 MEQ: 10 INJECTION, SOLUTION INTRAVENOUS at 11:50

## 2020-01-01 RX ADMIN — METRONIDAZOLE 500 MG: 500 INJECTION, SOLUTION INTRAVENOUS at 07:00

## 2020-01-01 RX ADMIN — MORPHINE SULFATE 1 MG: 2 INJECTION, SOLUTION INTRAMUSCULAR; INTRAVENOUS at 09:00

## 2020-01-01 RX ADMIN — METRONIDAZOLE 500 MG: 500 INJECTION, SOLUTION INTRAVENOUS at 08:37

## 2020-01-01 RX ADMIN — LEVETIRACETAM 1500 MG: 100 INJECTION, SOLUTION INTRAVENOUS at 02:06

## 2020-01-01 RX ADMIN — METRONIDAZOLE 500 MG: 500 INJECTION, SOLUTION INTRAVENOUS at 22:53

## 2020-01-01 RX ADMIN — LACOSAMIDE 150 MG: 50 TABLET, FILM COATED ORAL at 09:54

## 2020-01-01 RX ADMIN — SODIUM CHLORIDE: 9 INJECTION, SOLUTION INTRAVENOUS at 01:59

## 2020-01-01 RX ADMIN — VANCOMYCIN HYDROCHLORIDE 1000 MG: 1 INJECTION, POWDER, LYOPHILIZED, FOR SOLUTION INTRAVENOUS at 20:46

## 2020-01-01 RX ADMIN — MORPHINE SULFATE 1 MG: 2 INJECTION, SOLUTION INTRAMUSCULAR; INTRAVENOUS at 13:40

## 2020-01-01 RX ADMIN — SODIUM CHLORIDE, PRESERVATIVE FREE 10 ML: 5 INJECTION INTRAVENOUS at 09:00

## 2020-01-01 RX ADMIN — ENOXAPARIN SODIUM 40 MG: 40 INJECTION SUBCUTANEOUS at 09:37

## 2020-01-01 RX ADMIN — ENOXAPARIN SODIUM 40 MG: 40 INJECTION SUBCUTANEOUS at 10:15

## 2020-01-01 RX ADMIN — SODIUM CHLORIDE, PRESERVATIVE FREE 2 G: 5 INJECTION INTRAVENOUS at 15:08

## 2020-01-01 RX ADMIN — METRONIDAZOLE 500 MG: 500 INJECTION, SOLUTION INTRAVENOUS at 15:46

## 2020-01-01 RX ADMIN — BISACODYL 10 MG: 10 SUPPOSITORY RECTAL at 01:44

## 2020-01-01 RX ADMIN — ENOXAPARIN SODIUM 40 MG: 40 INJECTION SUBCUTANEOUS at 08:11

## 2020-01-01 RX ADMIN — DEXTROSE AND SODIUM CHLORIDE: 5; 450 INJECTION, SOLUTION INTRAVENOUS at 03:56

## 2020-01-01 RX ADMIN — SODIUM CHLORIDE, PRESERVATIVE FREE 10 ML: 5 INJECTION INTRAVENOUS at 11:21

## 2020-01-01 RX ADMIN — Medication 300 UNITS: at 23:33

## 2020-01-01 RX ADMIN — PIPERACILLIN AND TAZOBACTAM 4.5 G: 4; .5 INJECTION, POWDER, LYOPHILIZED, FOR SOLUTION INTRAVENOUS; PARENTERAL at 23:04

## 2020-01-01 RX ADMIN — SODIUM CHLORIDE: 9 INJECTION, SOLUTION INTRAVENOUS at 22:53

## 2020-01-01 RX ADMIN — ENOXAPARIN SODIUM 40 MG: 40 INJECTION SUBCUTANEOUS at 09:17

## 2020-01-01 RX ADMIN — METRONIDAZOLE 500 MG: 500 INJECTION, SOLUTION INTRAVENOUS at 00:08

## 2020-01-01 RX ADMIN — Medication 1 TABLET: at 08:11

## 2020-01-01 RX ADMIN — MORPHINE SULFATE 2 MG: 2 INJECTION, SOLUTION INTRAMUSCULAR; INTRAVENOUS at 20:04

## 2020-01-01 RX ADMIN — MORPHINE SULFATE 1 MG: 2 INJECTION, SOLUTION INTRAMUSCULAR; INTRAVENOUS at 03:43

## 2020-01-01 RX ADMIN — LEVETIRACETAM 1500 MG: 100 INJECTION, SOLUTION INTRAVENOUS at 13:04

## 2020-01-01 RX ADMIN — DEXTROSE AND SODIUM CHLORIDE: 5; 450 INJECTION, SOLUTION INTRAVENOUS at 17:00

## 2020-01-01 RX ADMIN — METRONIDAZOLE 500 MG: 500 INJECTION, SOLUTION INTRAVENOUS at 08:01

## 2020-01-01 RX ADMIN — METRONIDAZOLE 500 MG: 500 INJECTION, SOLUTION INTRAVENOUS at 06:38

## 2020-01-01 RX ADMIN — IOPAMIDOL 80 ML: 755 INJECTION, SOLUTION INTRAVENOUS at 13:19

## 2020-01-01 RX ADMIN — DEXTROSE AND SODIUM CHLORIDE: 5; 450 INJECTION, SOLUTION INTRAVENOUS at 00:18

## 2020-01-01 RX ADMIN — SODIUM CHLORIDE: 9 INJECTION, SOLUTION INTRAVENOUS at 08:37

## 2020-01-01 RX ADMIN — LUBIPROSTONE 24 MCG: 24 CAPSULE, GELATIN COATED ORAL at 08:11

## 2020-01-01 RX ADMIN — LEVETIRACETAM 1500 MG: 500 TABLET ORAL at 09:53

## 2020-01-01 RX ADMIN — SODIUM CHLORIDE, PRESERVATIVE FREE 2 G: 5 INJECTION INTRAVENOUS at 14:37

## 2020-01-01 RX ADMIN — METRONIDAZOLE 500 MG: 500 INJECTION, SOLUTION INTRAVENOUS at 23:24

## 2020-01-01 RX ADMIN — SODIUM CHLORIDE, PRESERVATIVE FREE 2 G: 5 INJECTION INTRAVENOUS at 14:55

## 2020-01-01 RX ADMIN — IOPAMIDOL 110 ML: 755 INJECTION, SOLUTION INTRAVENOUS at 20:12

## 2020-01-01 RX ADMIN — LEVETIRACETAM 1500 MG: 100 INJECTION, SOLUTION INTRAVENOUS at 13:34

## 2020-01-01 RX ADMIN — METRONIDAZOLE 500 MG: 500 INJECTION, SOLUTION INTRAVENOUS at 15:40

## 2020-01-01 RX ADMIN — DEXTROSE AND SODIUM CHLORIDE: 5; 450 INJECTION, SOLUTION INTRAVENOUS at 08:29

## 2020-01-01 RX ADMIN — POTASSIUM CHLORIDE 10 MEQ: 10 INJECTION, SOLUTION INTRAVENOUS at 15:13

## 2020-01-01 RX ADMIN — HYDROCODONE BITARTRATE AND ACETAMINOPHEN 1 TABLET: 5; 325 TABLET ORAL at 13:40

## 2020-01-01 RX ADMIN — Medication 300 UNITS: at 09:55

## 2020-01-01 RX ADMIN — MORPHINE SULFATE 1 MG: 2 INJECTION, SOLUTION INTRAMUSCULAR; INTRAVENOUS at 18:07

## 2020-01-01 RX ADMIN — SODIUM CHLORIDE: 9 INJECTION, SOLUTION INTRAVENOUS at 12:24

## 2020-01-01 RX ADMIN — SODIUM CHLORIDE: 9 INJECTION, SOLUTION INTRAVENOUS at 14:42

## 2020-01-01 RX ADMIN — LEVETIRACETAM 1500 MG: 100 INJECTION, SOLUTION INTRAVENOUS at 00:51

## 2020-01-01 RX ADMIN — DEXTROSE AND SODIUM CHLORIDE: 5; 450 INJECTION, SOLUTION INTRAVENOUS at 16:30

## 2020-01-01 RX ADMIN — METRONIDAZOLE 500 MG: 500 INJECTION, SOLUTION INTRAVENOUS at 23:59

## 2020-01-01 RX ADMIN — MORPHINE SULFATE 1 MG: 2 INJECTION, SOLUTION INTRAMUSCULAR; INTRAVENOUS at 19:32

## 2020-01-01 RX ADMIN — Medication 300 UNITS: at 03:39

## 2020-01-01 RX ADMIN — MORPHINE SULFATE 1 MG: 2 INJECTION, SOLUTION INTRAMUSCULAR; INTRAVENOUS at 04:07

## 2020-01-01 RX ADMIN — LACOSAMIDE 150 MG: 50 TABLET, FILM COATED ORAL at 10:49

## 2020-01-01 RX ADMIN — MORPHINE SULFATE 1 MG: 2 INJECTION, SOLUTION INTRAMUSCULAR; INTRAVENOUS at 02:16

## 2020-01-01 RX ADMIN — IOPAMIDOL 110 ML: 755 INJECTION, SOLUTION INTRAVENOUS at 21:43

## 2020-01-01 RX ADMIN — HYDROCODONE BITARTRATE AND ACETAMINOPHEN 1 TABLET: 5; 325 TABLET ORAL at 08:22

## 2020-01-01 RX ADMIN — METRONIDAZOLE 500 MG: 500 INJECTION, SOLUTION INTRAVENOUS at 15:25

## 2020-01-01 RX ADMIN — MORPHINE SULFATE 1 MG: 2 INJECTION, SOLUTION INTRAMUSCULAR; INTRAVENOUS at 04:23

## 2020-01-01 RX ADMIN — DOCUSATE SODIUM 100 MG: 100 CAPSULE, LIQUID FILLED ORAL at 21:44

## 2020-01-01 RX ADMIN — LUBIPROSTONE 24 MCG: 24 CAPSULE, GELATIN COATED ORAL at 09:54

## 2020-01-01 RX ADMIN — METRONIDAZOLE 500 MG: 500 INJECTION, SOLUTION INTRAVENOUS at 23:49

## 2020-01-01 RX ADMIN — METRONIDAZOLE 500 MG: 500 INJECTION, SOLUTION INTRAVENOUS at 06:31

## 2020-01-01 RX ADMIN — DEXTROSE AND SODIUM CHLORIDE: 5; 450 INJECTION, SOLUTION INTRAVENOUS at 14:54

## 2020-01-01 RX ADMIN — LEVETIRACETAM 1500 MG: 100 INJECTION, SOLUTION INTRAVENOUS at 01:27

## 2020-01-01 RX ADMIN — METRONIDAZOLE 500 MG: 500 INJECTION, SOLUTION INTRAVENOUS at 23:05

## 2020-01-01 RX ADMIN — LACOSAMIDE 150 MG: 50 TABLET, FILM COATED ORAL at 20:38

## 2020-01-01 RX ADMIN — PIPERACILLIN AND TAZOBACTAM 3.38 G: 3; .375 INJECTION, POWDER, FOR SOLUTION INTRAVENOUS at 06:33

## 2020-01-01 RX ADMIN — LEVETIRACETAM 1500 MG: 100 INJECTION, SOLUTION INTRAVENOUS at 01:09

## 2020-01-01 RX ADMIN — SODIUM CHLORIDE 25 ML: 9 INJECTION, SOLUTION INTRAVENOUS at 10:52

## 2020-01-01 RX ADMIN — LEVETIRACETAM 1500 MG: 100 INJECTION, SOLUTION INTRAVENOUS at 01:30

## 2020-01-01 RX ADMIN — POTASSIUM CHLORIDE 10 MEQ: 10 INJECTION, SOLUTION INTRAVENOUS at 21:40

## 2020-01-01 RX ADMIN — DRONABINOL 2.5 MG: 2.5 CAPSULE ORAL at 08:11

## 2020-01-01 RX ADMIN — Medication 300 UNITS: at 20:08

## 2020-01-01 RX ADMIN — METRONIDAZOLE 500 MG: 500 INJECTION, SOLUTION INTRAVENOUS at 06:39

## 2020-01-01 RX ADMIN — POLYETHYLENE GLYCOL 3350 17 G: 17 POWDER, FOR SOLUTION ORAL at 09:38

## 2020-01-01 RX ADMIN — SODIUM CHLORIDE, PRESERVATIVE FREE 10 ML: 5 INJECTION INTRAVENOUS at 07:11

## 2020-01-01 RX ADMIN — POTASSIUM CHLORIDE 10 MEQ: 10 INJECTION, SOLUTION INTRAVENOUS at 12:49

## 2020-01-01 RX ADMIN — LUBIPROSTONE 24 MCG: 24 CAPSULE, GELATIN COATED ORAL at 16:47

## 2020-01-01 RX ADMIN — METRONIDAZOLE 500 MG: 500 INJECTION, SOLUTION INTRAVENOUS at 00:14

## 2020-01-01 RX ADMIN — LEVETIRACETAM 1500 MG: 500 TABLET ORAL at 20:38

## 2020-01-01 RX ADMIN — LACOSAMIDE 150 MG: 50 TABLET, FILM COATED ORAL at 21:38

## 2020-01-01 RX ADMIN — LEVETIRACETAM 1500 MG: 100 INJECTION, SOLUTION INTRAVENOUS at 01:28

## 2020-01-01 RX ADMIN — LEVETIRACETAM 1500 MG: 100 INJECTION, SOLUTION INTRAVENOUS at 13:30

## 2020-01-01 RX ADMIN — POTASSIUM CHLORIDE 10 MEQ: 10 INJECTION, SOLUTION INTRAVENOUS at 12:45

## 2020-01-01 RX ADMIN — METRONIDAZOLE 500 MG: 500 INJECTION, SOLUTION INTRAVENOUS at 15:04

## 2020-01-01 RX ADMIN — LEVETIRACETAM 1500 MG: 100 INJECTION, SOLUTION INTRAVENOUS at 13:33

## 2020-01-01 RX ADMIN — SODIUM CHLORIDE, PRESERVATIVE FREE 2 G: 5 INJECTION INTRAVENOUS at 14:46

## 2020-01-01 RX ADMIN — SODIUM CHLORIDE, PRESERVATIVE FREE 2 G: 5 INJECTION INTRAVENOUS at 17:41

## 2020-01-01 RX ADMIN — MORPHINE SULFATE 1 MG: 2 INJECTION, SOLUTION INTRAMUSCULAR; INTRAVENOUS at 15:42

## 2020-01-01 RX ADMIN — METRONIDAZOLE 500 MG: 500 INJECTION, SOLUTION INTRAVENOUS at 14:44

## 2020-01-01 RX ADMIN — ENOXAPARIN SODIUM 40 MG: 40 INJECTION SUBCUTANEOUS at 10:58

## 2020-01-01 RX ADMIN — LIDOCAINE HYDROCHLORIDE 0.5 ML: 10 INJECTION, SOLUTION EPIDURAL; INFILTRATION; INTRACAUDAL; PERINEURAL at 14:30

## 2020-01-01 RX ADMIN — POTASSIUM CHLORIDE 10 MEQ: 10 INJECTION, SOLUTION INTRAVENOUS at 20:44

## 2020-01-01 RX ADMIN — LEVETIRACETAM 1500 MG: 100 INJECTION, SOLUTION INTRAVENOUS at 01:08

## 2020-01-01 RX ADMIN — ACETAMINOPHEN 650 MG: 650 SUPPOSITORY RECTAL at 01:06

## 2020-01-01 RX ADMIN — PROPOFOL 50 MG: 10 INJECTION, EMULSION INTRAVENOUS at 08:15

## 2020-01-01 RX ADMIN — METRONIDAZOLE 500 MG: 500 INJECTION, SOLUTION INTRAVENOUS at 16:50

## 2020-01-01 RX ADMIN — METRONIDAZOLE 500 MG: 500 INJECTION, SOLUTION INTRAVENOUS at 06:57

## 2020-01-01 RX ADMIN — POTASSIUM CHLORIDE 10 MEQ: 10 INJECTION, SOLUTION INTRAVENOUS at 12:48

## 2020-01-01 RX ADMIN — POTASSIUM CHLORIDE 10 MEQ: 10 INJECTION, SOLUTION INTRAVENOUS at 13:57

## 2020-01-01 RX ADMIN — POTASSIUM CHLORIDE 10 MEQ: 10 INJECTION, SOLUTION INTRAVENOUS at 19:41

## 2020-01-01 RX ADMIN — DEXTROSE AND SODIUM CHLORIDE: 5; 450 INJECTION, SOLUTION INTRAVENOUS at 03:47

## 2020-01-01 RX ADMIN — METRONIDAZOLE 500 MG: 500 INJECTION, SOLUTION INTRAVENOUS at 09:17

## 2020-01-01 RX ADMIN — Medication 300 UNITS: at 11:19

## 2020-01-01 RX ADMIN — SODIUM CHLORIDE, PRESERVATIVE FREE 2 G: 5 INJECTION INTRAVENOUS at 14:53

## 2020-01-01 RX ADMIN — ENOXAPARIN SODIUM 40 MG: 40 INJECTION SUBCUTANEOUS at 11:18

## 2020-01-01 RX ADMIN — POTASSIUM CHLORIDE 10 MEQ: 10 INJECTION, SOLUTION INTRAVENOUS at 15:10

## 2020-01-01 RX ADMIN — POTASSIUM CHLORIDE 10 MEQ: 10 INJECTION, SOLUTION INTRAVENOUS at 17:19

## 2020-01-01 RX ADMIN — LEVETIRACETAM 1500 MG: 100 INJECTION, SOLUTION INTRAVENOUS at 02:19

## 2020-01-01 RX ADMIN — METRONIDAZOLE 500 MG: 500 INJECTION, SOLUTION INTRAVENOUS at 08:41

## 2020-01-01 RX ADMIN — LEVETIRACETAM 1500 MG: 100 INJECTION, SOLUTION INTRAVENOUS at 12:26

## 2020-01-01 RX ADMIN — METRONIDAZOLE 500 MG: 500 INJECTION, SOLUTION INTRAVENOUS at 23:32

## 2020-01-01 RX ADMIN — MORPHINE SULFATE 1 MG: 2 INJECTION, SOLUTION INTRAMUSCULAR; INTRAVENOUS at 17:58

## 2020-01-01 RX ADMIN — METRONIDAZOLE 500 MG: 500 INJECTION, SOLUTION INTRAVENOUS at 23:30

## 2020-01-01 RX ADMIN — METRONIDAZOLE 500 MG: 500 INJECTION, SOLUTION INTRAVENOUS at 06:50

## 2020-01-01 RX ADMIN — SODIUM CHLORIDE, PRESERVATIVE FREE 10 ML: 5 INJECTION INTRAVENOUS at 21:16

## 2020-01-01 RX ADMIN — METRONIDAZOLE 500 MG: 500 INJECTION, SOLUTION INTRAVENOUS at 15:36

## 2020-01-01 RX ADMIN — SODIUM CHLORIDE 500 ML: 9 INJECTION, SOLUTION INTRAVENOUS at 21:16

## 2020-01-01 RX ADMIN — DEXTROSE AND SODIUM CHLORIDE: 5; 450 INJECTION, SOLUTION INTRAVENOUS at 03:39

## 2020-01-01 RX ADMIN — METRONIDAZOLE 500 MG: 500 INJECTION, SOLUTION INTRAVENOUS at 23:23

## 2020-01-01 RX ADMIN — ACETAMINOPHEN 650 MG: 325 TABLET ORAL at 21:39

## 2020-01-01 RX ADMIN — SODIUM CHLORIDE, PRESERVATIVE FREE 2 G: 5 INJECTION INTRAVENOUS at 14:04

## 2020-01-01 RX ADMIN — LEVETIRACETAM 1500 MG: 100 INJECTION, SOLUTION INTRAVENOUS at 12:25

## 2020-01-01 RX ADMIN — SODIUM CHLORIDE, PRESERVATIVE FREE 10 ML: 5 INJECTION INTRAVENOUS at 20:05

## 2020-01-01 RX ADMIN — ENOXAPARIN SODIUM 40 MG: 40 INJECTION SUBCUTANEOUS at 09:56

## 2020-01-01 RX ADMIN — METRONIDAZOLE 500 MG: 500 INJECTION, SOLUTION INTRAVENOUS at 17:40

## 2020-01-01 RX ADMIN — ENOXAPARIN SODIUM 40 MG: 40 INJECTION SUBCUTANEOUS at 08:55

## 2020-01-01 RX ADMIN — SODIUM CHLORIDE, PRESERVATIVE FREE 2 G: 5 INJECTION INTRAVENOUS at 16:50

## 2020-01-01 RX ADMIN — METRONIDAZOLE 500 MG: 500 INJECTION, SOLUTION INTRAVENOUS at 00:17

## 2020-01-01 RX ADMIN — METRONIDAZOLE 500 MG: 500 INJECTION, SOLUTION INTRAVENOUS at 09:55

## 2020-01-01 RX ADMIN — POTASSIUM CHLORIDE 10 MEQ: 10 INJECTION, SOLUTION INTRAVENOUS at 10:58

## 2020-01-01 RX ADMIN — Medication 1 TABLET: at 10:09

## 2020-01-01 RX ADMIN — LEVETIRACETAM 1500 MG: 100 INJECTION, SOLUTION INTRAVENOUS at 02:10

## 2020-01-01 RX ADMIN — SODIUM CHLORIDE 1000 ML: 9 INJECTION, SOLUTION INTRAVENOUS at 20:46

## 2020-01-01 RX ADMIN — SODIUM CHLORIDE, PRESERVATIVE FREE 10 ML: 5 INJECTION INTRAVENOUS at 20:36

## 2020-01-01 RX ADMIN — Medication 1 TABLET: at 10:49

## 2020-01-01 RX ADMIN — OYSTER SHELL CALCIUM WITH VITAMIN D 1 TABLET: 500; 200 TABLET, FILM COATED ORAL at 21:49

## 2020-01-01 RX ADMIN — METRONIDAZOLE 500 MG: 500 INJECTION, SOLUTION INTRAVENOUS at 00:09

## 2020-01-01 RX ADMIN — LEVETIRACETAM 1500 MG: 100 INJECTION, SOLUTION INTRAVENOUS at 16:30

## 2020-01-01 RX ADMIN — METRONIDAZOLE 500 MG: 500 INJECTION, SOLUTION INTRAVENOUS at 14:54

## 2020-01-01 RX ADMIN — SODIUM CHLORIDE, PRESERVATIVE FREE 2 G: 5 INJECTION INTRAVENOUS at 15:40

## 2020-01-01 RX ADMIN — MORPHINE SULFATE 1 MG: 2 INJECTION, SOLUTION INTRAMUSCULAR; INTRAVENOUS at 21:15

## 2020-01-01 RX ADMIN — ENOXAPARIN SODIUM 40 MG: 40 INJECTION SUBCUTANEOUS at 08:50

## 2020-01-01 RX ADMIN — Medication 300 UNITS: at 20:03

## 2020-01-01 RX ADMIN — POTASSIUM CHLORIDE 10 MEQ: 10 INJECTION, SOLUTION INTRAVENOUS at 16:15

## 2020-01-01 RX ADMIN — LEVETIRACETAM 1500 MG: 100 INJECTION, SOLUTION INTRAVENOUS at 14:23

## 2020-01-01 RX ADMIN — LEVETIRACETAM 1500 MG: 100 INJECTION, SOLUTION INTRAVENOUS at 13:14

## 2020-01-01 RX ADMIN — SODIUM CHLORIDE, PRESERVATIVE FREE 2 G: 5 INJECTION INTRAVENOUS at 15:28

## 2020-01-01 RX ADMIN — LEVETIRACETAM 1500 MG: 100 INJECTION, SOLUTION INTRAVENOUS at 13:25

## 2020-01-01 RX ADMIN — ENOXAPARIN SODIUM 40 MG: 40 INJECTION SUBCUTANEOUS at 09:19

## 2020-01-01 RX ADMIN — ACETAMINOPHEN 650 MG: 650 SUPPOSITORY RECTAL at 09:21

## 2020-01-01 RX ADMIN — SODIUM CHLORIDE, PRESERVATIVE FREE 2 G: 5 INJECTION INTRAVENOUS at 15:12

## 2020-01-01 RX ADMIN — SODIUM CHLORIDE: 9 INJECTION, SOLUTION INTRAVENOUS at 06:23

## 2020-01-01 RX ADMIN — SODIUM CHLORIDE: 9 INJECTION, SOLUTION INTRAVENOUS at 08:12

## 2020-01-01 RX ADMIN — ALTEPLASE 1 MG: 2.2 INJECTION, POWDER, LYOPHILIZED, FOR SOLUTION INTRAVENOUS at 05:46

## 2020-01-01 RX ADMIN — SODIUM CHLORIDE, PRESERVATIVE FREE 10 ML: 5 INJECTION INTRAVENOUS at 09:56

## 2020-01-01 RX ADMIN — SODIUM CHLORIDE, PRESERVATIVE FREE 10 ML: 5 INJECTION INTRAVENOUS at 20:08

## 2020-01-01 RX ADMIN — Medication 300 UNITS: at 20:36

## 2020-01-01 RX ADMIN — METRONIDAZOLE 500 MG: 500 INJECTION, SOLUTION INTRAVENOUS at 07:11

## 2020-01-01 RX ADMIN — MORPHINE SULFATE 1 MG: 2 INJECTION, SOLUTION INTRAMUSCULAR; INTRAVENOUS at 08:11

## 2020-01-01 RX ADMIN — HYDROCODONE BITARTRATE AND ACETAMINOPHEN 1 TABLET: 5; 325 TABLET ORAL at 16:47

## 2020-01-01 RX ADMIN — METRONIDAZOLE 500 MG: 500 INJECTION, SOLUTION INTRAVENOUS at 23:35

## 2020-01-01 RX ADMIN — MORPHINE SULFATE 1 MG: 2 INJECTION, SOLUTION INTRAMUSCULAR; INTRAVENOUS at 13:11

## 2020-01-01 RX ADMIN — MEGESTROL ACETATE 40 MG: 40 TABLET ORAL at 10:49

## 2020-01-01 RX ADMIN — LEVETIRACETAM 1500 MG: 100 INJECTION, SOLUTION INTRAVENOUS at 02:00

## 2020-01-01 RX ADMIN — METRONIDAZOLE 500 MG: 500 INJECTION, SOLUTION INTRAVENOUS at 16:01

## 2020-01-01 RX ADMIN — SODIUM CHLORIDE, PRESERVATIVE FREE 2 G: 5 INJECTION INTRAVENOUS at 15:47

## 2020-01-01 RX ADMIN — SODIUM CHLORIDE, PRESERVATIVE FREE 2 G: 5 INJECTION INTRAVENOUS at 15:19

## 2020-01-01 RX ADMIN — LEVETIRACETAM 1500 MG: 500 TABLET ORAL at 10:49

## 2020-01-01 RX ADMIN — ACETAMINOPHEN 650 MG: 650 SUPPOSITORY RECTAL at 12:27

## 2020-01-01 RX ADMIN — MEGESTROL ACETATE 40 MG: 40 TABLET ORAL at 09:54

## 2020-01-01 RX ADMIN — METRONIDAZOLE 500 MG: 500 INJECTION, SOLUTION INTRAVENOUS at 06:40

## 2020-01-01 RX ADMIN — STANDARDIZED SENNA CONCENTRATE 8.6 MG: 8.6 TABLET ORAL at 21:38

## 2020-01-01 RX ADMIN — LEVETIRACETAM 1500 MG: 500 TABLET ORAL at 21:51

## 2020-01-01 RX ADMIN — OYSTER SHELL CALCIUM WITH VITAMIN D 1 TABLET: 500; 200 TABLET, FILM COATED ORAL at 10:50

## 2020-01-01 RX ADMIN — METRONIDAZOLE 500 MG: 500 INJECTION, SOLUTION INTRAVENOUS at 15:44

## 2020-01-01 RX ADMIN — METRONIDAZOLE 500 MG: 500 INJECTION, SOLUTION INTRAVENOUS at 15:19

## 2020-01-01 RX ADMIN — MORPHINE SULFATE 1 MG: 2 INJECTION, SOLUTION INTRAMUSCULAR; INTRAVENOUS at 14:01

## 2020-01-01 ASSESSMENT — PAIN SCALES - GENERAL
PAINLEVEL_OUTOF10: 3
PAINLEVEL_OUTOF10: 10
PAINLEVEL_OUTOF10: 6
PAINLEVEL_OUTOF10: 0
PAINLEVEL_OUTOF10: 0
PAINLEVEL_OUTOF10: 4
PAINLEVEL_OUTOF10: 0
PAINLEVEL_OUTOF10: 3
PAINLEVEL_OUTOF10: 0
PAINLEVEL_OUTOF10: 7
PAINLEVEL_OUTOF10: 0
PAINLEVEL_OUTOF10: 5
PAINLEVEL_OUTOF10: 0
PAINLEVEL_OUTOF10: 4
PAINLEVEL_OUTOF10: 5
PAINLEVEL_OUTOF10: 0
PAINLEVEL_OUTOF10: 6
PAINLEVEL_OUTOF10: 8
PAINLEVEL_OUTOF10: 2
PAINLEVEL_OUTOF10: 0
PAINLEVEL_OUTOF10: 4
PAINLEVEL_OUTOF10: 5
PAINLEVEL_OUTOF10: 4
PAINLEVEL_OUTOF10: 6
PAINLEVEL_OUTOF10: 2
PAINLEVEL_OUTOF10: 0
PAINLEVEL_OUTOF10: 5
PAINLEVEL_OUTOF10: 4
PAINLEVEL_OUTOF10: 10
PAINLEVEL_OUTOF10: 4
PAINLEVEL_OUTOF10: 0
PAINLEVEL_OUTOF10: 0
PAINLEVEL_OUTOF10: 2
PAINLEVEL_OUTOF10: 0
PAINLEVEL_OUTOF10: 2
PAINLEVEL_OUTOF10: 4
PAINLEVEL_OUTOF10: 0
PAINLEVEL_OUTOF10: 0
PAINLEVEL_OUTOF10: 5
PAINLEVEL_OUTOF10: 7
PAINLEVEL_OUTOF10: 8
PAINLEVEL_OUTOF10: 3
PAINLEVEL_OUTOF10: 3
PAINLEVEL_OUTOF10: 5
PAINLEVEL_OUTOF10: 0
PAINLEVEL_OUTOF10: 2
PAINLEVEL_OUTOF10: 0
PAINLEVEL_OUTOF10: 3
PAINLEVEL_OUTOF10: 0

## 2020-01-01 ASSESSMENT — PAIN DESCRIPTION - PAIN TYPE
TYPE: OTHER (COMMENT)
TYPE: ACUTE PAIN
TYPE: OTHER (COMMENT)

## 2020-01-01 ASSESSMENT — PAIN SCALES - PAIN ASSESSMENT IN ADVANCED DEMENTIA (PAINAD)
NEGVOCALIZATION: 0
FACIALEXPRESSION: 1
BODYLANGUAGE: 1
BREATHING: 1
BODYLANGUAGE: 0
CONSOLABILITY: 1
NEGVOCALIZATION: 1
FACIALEXPRESSION: 0
CONSOLABILITY: 0
BODYLANGUAGE: 0
CONSOLABILITY: 1
BREATHING: 1
TOTALSCORE: 5
FACIALEXPRESSION: 0
NEGVOCALIZATION: 1
BODYLANGUAGE: 1
NEGVOCALIZATION: 1
FACIALEXPRESSION: 0
BODYLANGUAGE: 1
FACIALEXPRESSION: 0
CONSOLABILITY: 0
BREATHING: 1
NEGVOCALIZATION: 0
BREATHING: 1
TOTALSCORE: 5
NEGVOCALIZATION: 1
BREATHING: 1
FACIALEXPRESSION: 0
BODYLANGUAGE: 0
CONSOLABILITY: 0
NEGVOCALIZATION: 1
BREATHING: 1
FACIALEXPRESSION: 1
FACIALEXPRESSION: 0
CONSOLABILITY: 0
TOTALSCORE: 4
NEGVOCALIZATION: 0
BODYLANGUAGE: 1
BODYLANGUAGE: 0
CONSOLABILITY: 1
TOTALSCORE: 5
BREATHING: 0
BODYLANGUAGE: 1
FACIALEXPRESSION: 0
TOTALSCORE: 3
TOTALSCORE: 3
BREATHING: 0
BODYLANGUAGE: 1
CONSOLABILITY: 0
BREATHING: 0
BREATHING: 2
CONSOLABILITY: 0
TOTALSCORE: 4
FACIALEXPRESSION: 2
TOTALSCORE: 0
CONSOLABILITY: 1
NEGVOCALIZATION: 1
TOTALSCORE: 0
NEGVOCALIZATION: 1
TOTALSCORE: 0

## 2020-01-01 ASSESSMENT — PAIN SCALES - WONG BAKER
WONGBAKER_NUMERICALRESPONSE: 0
WONGBAKER_NUMERICALRESPONSE: 0

## 2020-01-01 ASSESSMENT — PAIN DESCRIPTION - LOCATION
LOCATION: OTHER (COMMENT)

## 2020-01-01 ASSESSMENT — PAIN DESCRIPTION - DESCRIPTORS
DESCRIPTORS: OTHER (COMMENT)
DESCRIPTORS: OTHER (COMMENT)
DESCRIPTORS: PATIENT UNABLE TO DESCRIBE
DESCRIPTORS: PATIENT UNABLE TO DESCRIBE
DESCRIPTORS: OTHER (COMMENT)

## 2020-01-01 ASSESSMENT — ENCOUNTER SYMPTOMS: ABDOMINAL PAIN: 1

## 2020-01-01 ASSESSMENT — PAIN DESCRIPTION - FREQUENCY: FREQUENCY: INTERMITTENT

## 2020-01-01 ASSESSMENT — PAIN DESCRIPTION - ONSET: ONSET: UNABLE TO TELL

## 2020-01-01 ASSESSMENT — PAIN DESCRIPTION - ORIENTATION
ORIENTATION: OTHER (COMMENT)

## 2020-02-01 NOTE — ED PROVIDER NOTES
Gunnar Hall is a 62-year-old female with PMH of MRDD nonverbal and seizure history presented from [facility] to a new living facility with vaginal bleeding and abdominal pain that has been present for 2 days and is unchanged. Patient had a D&C with Dr. Mynor Gill 12/19 and facility was informed that she would not have have any vaginal bleeding following the D&C since she is postmenopausal. Patient then began to have vaginal bleeding for two days with small clots. Patient is also having abdominal pain. Patient is nonverbal and does not respond to questions but has the appearance to staff that she is uncomfortable and has been moaning intermittently which is unusual for patient. Caregiver is unable to quantify the amount of bleeding. Patient is not bleeding through her depend. The history is provided by the nursing home, a caregiver and medical records. Vaginal Bleeding   Quality:  Typical of menses, dark red and clots  Severity:  Moderate  Onset quality:  Gradual  Duration:  2 days  Timing:  Constant  Progression:  Unchanged  Chronicity:  Recurrent  Menstrual history:  Irregular  Number of pads used:  1  Possible pregnancy: no    Context: at rest    Relieved by:  Nothing  Worsened by:  Nothing  Ineffective treatments:  None tried  Associated symptoms: abdominal pain    Risk factors: hx of endometriosis and gynecological surgery         Review of Systems   Unable to perform ROS: Patient nonverbal   Gastrointestinal: Positive for abdominal pain. Genitourinary: Positive for vaginal bleeding. Physical Exam  Vitals signs and nursing note reviewed. Constitutional:       General: She is not in acute distress. Appearance: She is not ill-appearing, toxic-appearing or diaphoretic. HENT:      Head: Normocephalic and atraumatic. Mouth/Throat:      Mouth: Mucous membranes are moist.   Eyes:      Conjunctiva/sclera: Conjunctivae normal.      Pupils: Pupils are equal, round, and reactive to light.    Neck: Musculoskeletal: Neck supple. No muscular tenderness. Cardiovascular:      Rate and Rhythm: Normal rate and regular rhythm. Pulmonary:      Effort: Pulmonary effort is normal.      Breath sounds: Normal breath sounds. Abdominal:      General: Bowel sounds are normal. There is no distension. Palpations: Abdomen is soft. Tenderness: There is no abdominal tenderness. There is no right CVA tenderness, left CVA tenderness, guarding or rebound. Musculoskeletal:      Right lower leg: No edema. Left lower leg: No edema. Skin:     General: Skin is warm and dry. Coloration: Skin is not pale. Findings: No erythema or rash. Neurological:      Mental Status: She is alert. Mental status is at baseline. Comments: Exam limited due to patient cooperation          Procedures     MDM  Number of Diagnoses or Management Options  Uterine leiomyoma, unspecified location:   Vaginal bleeding:   Diagnosis management comments: Gunnar Hall is a 62-year-old female who presented to the ED with vaginal bleeding. When patient was cathed for urine only a small amounts of bright red blood was observed, small amount of blood in depend no clots. Likely patient's symptoms due to fibroid vs abnormal uterine bleeding. Case discussed with Dr. Luis Mcdowell, she will follow up with patient Monday and requested patient be discharged on Megace. Discussed results and plan with caregiver along with indications to return to ED she is agreeable to plan. Patient is nontoxic and well appearing in ED. Pelvic exam could not be performed on patient due to contraction and body habitus. Patient did not appear in pain during exam. Hgb stable.         ED Course as of Feb 02 0340   Sat Feb 01, 2020   1179 Spoke with Dr. Luis Mcdowell, she will follow up with patient Monday she would like patient discharged on 40mg Megace QD    [SS]      ED Course User Index  [SS] Muriel Mckeon MD          ED Course as of Feb 02 0343   Sat Feb 01, 2020 1859 Spoke with Dr. Harrison Mata, she will follow up with patient Monday she would like patient discharged on 40mg Megace QD    [SS]      ED Course User Index  [SS] Miguel Angel Painter MD       --------------------------------------------- PAST HISTORY ---------------------------------------------  Past Medical History:  has a past medical history of Amenorrhea, Anemia, Constipation, Developmental disability, Dry eyes, Encephalopathy, Kyphoscoliosis, Leukocytosis, Menopausal and perimenopausal disorder, Mental retardation, Osteoporosis, Seizure disorder ([facility] [address] and Seizures ([facility] [address] Past Surgical History:  has no past surgical history on file. Social History:  reports that she has never smoked. She has never used smokeless tobacco. She reports that she does not drink alcohol or use drugs. Family History: family history is not on file. The patients home medications have been reviewed. Allergies: Patient has no known allergies.     -------------------------------------------------- RESULTS -------------------------------------------------  Labs:  Results for orders placed or performed during the hospital encounter of 02/01/20   CBC auto differential   Result Value Ref Range    WBC 10.1 4.5 - 11.5 E9/L    RBC 4.53 3.50 - 5.50 E12/L    Hemoglobin 12.8 11.5 - 15.5 g/dL    Hematocrit 40.9 34.0 - 48.0 %    MCV 90.3 80.0 - 99.9 fL    MCH 28.3 26.0 - 35.0 pg    MCHC 31.3 (L) 32.0 - 34.5 %    RDW 13.0 11.5 - 15.0 fL    Platelets 273 857 - 315 E9/L    MPV 11.2 7.0 - 12.0 fL    Neutrophils % 59.1 43.0 - 80.0 %    Immature Granulocytes % 0.4 0.0 - 5.0 %    Lymphocytes % 25.6 20.0 - 42.0 %    Monocytes % 12.4 (H) 2.0 - 12.0 %    Eosinophils % 2.1 0.0 - 6.0 %    Basophils % 0.4 0.0 - 2.0 %    Neutrophils Absolute 5.96 1.80 - 7.30 E9/L    Immature Granulocytes # 0.04 E9/L    Lymphocytes Absolute 2.58 1.50 - 4.00 E9/L    Monocytes Absolute 1.25 (H) 0.10 - 0.95 E9/L    Eosinophils Absolute 0.21 0.05 - 0.50 E9/L Basophils Absolute 0.04 0.00 - 0.20 E9/L   Comprehensive Metabolic Panel w/ Reflex to MG   Result Value Ref Range    Sodium 141 132 - 146 mmol/L    Potassium reflex Magnesium 4.6 3.5 - 5.0 mmol/L    Chloride 103 98 - 107 mmol/L    CO2 28 22 - 29 mmol/L    Anion Gap 10 7 - 16 mmol/L    Glucose 92 74 - 99 mg/dL    BUN 8 8 - 23 mg/dL    CREATININE 0.6 0.5 - 1.0 mg/dL    GFR Non-African American >60 >=60 mL/min/1.73    GFR African American >60     Calcium 9.8 8.6 - 10.2 mg/dL    Total Protein 7.6 6.4 - 8.3 g/dL    Alb 3.9 3.5 - 5.2 g/dL    Total Bilirubin 0.2 0.0 - 1.2 mg/dL    Alkaline Phosphatase 145 (H) 35 - 104 U/L    ALT 9 0 - 32 U/L    AST 14 0 - 31 U/L   Lactic Acid, Plasma   Result Value Ref Range    Lactic Acid 1.1 0.5 - 2.2 mmol/L   Lipase   Result Value Ref Range    Lipase 31 13 - 60 U/L   Urinalysis with Microscopic   Result Value Ref Range    Color, UA Yellow Straw/Yellow    Clarity, UA Clear Clear    Glucose, Ur Negative Negative mg/dL    Bilirubin Urine Negative Negative    Ketones, Urine Negative Negative mg/dL    Specific Gravity, UA 1.025 1.005 - 1.030    Blood, Urine Negative Negative    pH, UA 7.5 5.0 - 9.0    Protein, UA Negative Negative mg/dL    Urobilinogen, Urine 0.2 <2.0 E.U./dL    Nitrite, Urine Negative Negative    Leukocyte Esterase, Urine Negative Negative    WBC, UA NONE 0 - 5 /HPF    RBC, UA NONE 0 - 2 /HPF    Bacteria, UA NONE /HPF       Radiology:  US PELVIS COMPLETE   Final Result      8.4 cm fundal exophytic uterine fibroid. Fluid in the cervical canal.      Nonvisualization ovaries. ------------------------- NURSING NOTES AND VITALS REVIEWED ---------------------------  Date / Time Roomed:  2/1/2020  4:26 PM  ED Bed Assignment:  18/18    The nursing notes within the ED encounter and vital signs as below have been reviewed.    /84   Pulse 77   Temp 98.4 °F (36.9 °C) (Axillary)   Resp 18   Ht 5' 2\" (1.575 m)   Wt 130 lb (59 kg)   SpO2 95%   BMI 23.78 kg/m²

## 2020-05-27 PROBLEM — A41.9 SEPSIS (HCC): Status: ACTIVE | Noted: 2020-01-01

## 2020-05-27 NOTE — ED PROVIDER NOTES
Constipation, Developmental disability, Dry eyes, Encephalopathy, Kyphoscoliosis, Leukocytosis, Menopausal and perimenopausal disorder, Mental retardation, Osteoporosis, Seizure disorder ([facility] [address] and Seizures ([facility] [address] Past Surgical History:  has no past surgical history on file. Social History:  reports that she has never smoked. She has never used smokeless tobacco. She reports that she does not drink alcohol or use drugs. Family History: family history is not on file. The patients home medications have been reviewed. Allergies: Patient has no known allergies.     -------------------------------------------------- RESULTS -------------------------------------------------    LABS:  Results for orders placed or performed during the hospital encounter of 05/27/20   CBC Auto Differential   Result Value Ref Range    WBC 22.0 (H) 4.5 - 11.5 E9/L    RBC 4.29 3.50 - 5.50 E12/L    Hemoglobin 11.9 11.5 - 15.5 g/dL    Hematocrit 38.0 34.0 - 48.0 %    MCV 88.6 80.0 - 99.9 fL    MCH 27.7 26.0 - 35.0 pg    MCHC 31.3 (L) 32.0 - 34.5 %    RDW 14.3 11.5 - 15.0 fL    Platelets 198 500 - 016 E9/L    MPV 11.2 7.0 - 12.0 fL    Neutrophils % 83.0 (H) 43.0 - 80.0 %    Immature Granulocytes % 0.7 0.0 - 5.0 %    Lymphocytes % 7.0 (L) 20.0 - 42.0 %    Monocytes % 8.8 2.0 - 12.0 %    Eosinophils % 0.1 0.0 - 6.0 %    Basophils % 0.4 0.0 - 2.0 %    Neutrophils Absolute 18.21 (H) 1.80 - 7.30 E9/L    Immature Granulocytes # 0.16 E9/L    Lymphocytes Absolute 1.53 1.50 - 4.00 E9/L    Monocytes Absolute 1.94 (H) 0.10 - 0.95 E9/L    Eosinophils Absolute 0.03 (L) 0.05 - 0.50 E9/L    Basophils Absolute 0.09 0.00 - 0.20 E9/L    Anisocytosis 1+    Brain Natriuretic Peptide   Result Value Ref Range    Pro- (H) 0 - 125 pg/mL   Urinalysis, reflex to microscopic   Result Value Ref Range    Color, UA NATALIA (A) Straw/Yellow    Clarity, UA CLOUDY (A) Clear    Glucose, Ur Negative Negative mg/dL    Bilirubin Urine SMALL (A) Negative quadrant, and widespread   hematogenous metastatic disease to the lung parenchyma. There is a   small suspicious nodule in the right lobe the liver could represent   also metastatic. 2. It is difficult to determine if it the large right lower quadrant   lesion of complex/cystic appearance is relate with the bone structures   or with the mesentery or being an exophytic lesion related with the   right ovary or a single large pedunculated necrotic myoma correlate   with the uterus which appears to be otherwise of unremarkable   appearance except by fluid contents within the endometrial cavity. ALERT:  ABNORMAL REPORT. XR CHEST PORTABLE   Final Result      No evidence for acute cardiopulmonary process. ------------------------- NURSING NOTES AND VITALS REVIEWED ---------------------------  Date / Time Roomed:  5/27/2020  5:54 PM  ED Bed Assignment:  13/13    The nursing notes within the ED encounter and vital signs as below have been reviewed.      Patient Vitals for the past 24 hrs:   BP Temp Temp src Pulse Resp SpO2 Height Weight   05/27/20 2145 109/71 -- -- -- -- -- -- --   05/27/20 2130 109/71 -- -- 92 -- 98 % -- --   05/27/20 2115 109/71 -- -- 90 -- 97 % -- --   05/27/20 2100 109/71 -- -- 87 -- 98 % -- --   05/27/20 2045 109/71 -- -- 87 -- 96 % -- --   05/27/20 2030 109/71 -- -- -- -- -- -- --   05/27/20 2015 109/71 -- -- 95 -- 98 % -- --   05/27/20 2000 -- -- -- 94 -- 95 % -- --   05/27/20 1945 135/80 -- -- 97 -- 96 % -- --   05/27/20 1758 135/80 100.4 °F (38 °C) Axillary 115 20 94 % 5' 2\" (1.575 m) 94 lb 4 oz (42.8 kg)   05/27/20 1726 -- 97.3 °F (36.3 °C) Temporal -- -- -- -- --       Oxygen Saturation Interpretation: Normal    ------------------------------------------ PROGRESS NOTES ------------------------------------------      Counseling:  I have spoken with the patient and discussed todays results, in addition to providing specific details for the plan of care and

## 2020-05-27 NOTE — ED NOTES
Bed: 13  Expected date:   Expected time:   Means of arrival:   Comments:  Manisha Morrissey RN  05/27/20 0982

## 2020-05-28 NOTE — PROGRESS NOTES
Consult dictated orders written. Will await the CAT scan of the chest.  So not sure how far to go forward with this. I am not sure who her power of  is have to discuss that further also.

## 2020-05-28 NOTE — H&P
[facility] History and Physical      CHIEF COMPLAINT: Poor appetite    History of Present Illness: 63-year-old female who is nonverbal, history of MR, seizures, contractures. Unable to provide history therefore is obtained from ED team who obtained from the caretaker. Apparently for the past 2 weeks she has had poor appetite and decreased interaction. Reportedly also had frothy yellow-green discharge from her vagina. She felt warm to the caretaker therefore she was brought to the ED due to concern for infection. In the ED her COVID19 was negative. CAT scan of the abdomen showed a mass in the right colon with concern for metastatic lesions to the lungs. On chart review it appears she had an ultrasound of her pelvis in February that showed exophytic uterine fibroid. It appears she had a history of vaginal bleeding status post D&C in December, in February she had another episode of vaginal bleeding was referred to gynecology as an outpatient. Informant(s) for H&P: Chart review    REVIEW OF SYSTEMS:  Unable to obtain as patient is nonverbal    PMH:  Past Medical History:   Diagnosis Date    Amenorrhea     Anemia     Constipation     Developmental disability     Dry eyes     Encephalopathy     Kyphoscoliosis     Leukocytosis     Menopausal and perimenopausal disorder     Mental retardation     Osteoporosis     [facility] [address] [address]     Seizures ([facility] [address]        Surgical History:  No past surgical history on file. Medications Prior to Admission:    Prior to Admission medications    Medication Sig Start Date End Date Taking? Authorizing Provider   HYDROcodone-acetaminophen (NORCO) 5-325 MG per tablet Take 1 tablet by mouth every 6 hours as needed for Pain.     Historical Provider, MD   megestrol (MEGACE) 40 MG tablet Take 1 tablet by mouth daily for 14 days 2/1/20 2/15/20  Denton Mcdowell MD   lacosamide (VIMPAT) 150 MG TABS tablet Take 1 tablet by mouth 2 times daily for 186 appearance except by fluid contents within the endometrial cavity. ALERT:  ABNORMAL REPORT. XR CHEST PORTABLE   Final Result      No evidence for acute cardiopulmonary process. EKG: Left bundle branch block similar to prior    ASSESSMENT:      Active Problems:    Sepsis [facility]):?  Genital source due to vaginal discharge  Abdominal mass: Recent fibroma on ultrasound. Metastatic lesions in the lung: Unclear primary. Could be from the abdominal lesion above. Fecal impaction  Mental retardation  Seizure disorder    PLAN:  Sepsis:?  Genital source due to complaint of discharge. .  Continue Zosyn for now. Gynecology consulted. Follow cultures    Right lower quadrant mass: Recent ultrasound showed exophytic fibroma from uterus that seems to correlate with location of this lesion. No other significant abdominal metastatic lesions other than possible liver.  -Check pelvic ultrasound again  -Gynecology consulted    Metastatic lesions of the lung:? Metastases from right lower quadrant. Will check CT of the chest to assess for lung primary    Fecal impaction: Continue home laxatives. Add enema and bisacodyl suppository    Seizure disorder: Resume home antiepileptics    Code Status: Full until it can be clarified  DVT prophylaxis: Hold for now pending gynecology evaluation      NOTE: This report was transcribed using voice recognition software. Every effort was made to ensure accuracy; however, inadvertent computerized transcription errors may be present.   Electronically signed by Antonio Maurer MD on 5/28/2020 at 12:02 AM

## 2020-05-28 NOTE — CONSULTS
docusate sodium  100 mg Oral Nightly    lacosamide  150 mg Oral BID    levETIRAcetam  1,500 mg Oral BID    megestrol  40 mg Oral Daily    therapeutic multivitamin-minerals  1 tablet Oral Daily    senna  1 tablet Oral Nightly    polyethylene glycol  17 g Oral Daily    lubiprostone  24 mcg Oral BID WC    piperacillin-tazobactam  3.375 g Intravenous Q8H    sodium chloride flush  10 mL Intravenous 2 times per day     Continuous Infusions:   sodium chloride 75 mL/hr at 05/28/20 0159    sodium chloride 25 mL (05/28/20 1052)     PRN Meds:guaiFENesin-dextromethorphan, HYDROcodone-acetaminophen, sodium chloride flush, acetaminophen **OR** acetaminophen, polyethylene glycol, promethazine **OR** ondansetron    Allergies:  Patient has no known allergies.     Social History:   Social History     Socioeconomic History    Marital status: Single     Spouse name: Not on file    Number of children: Not on file    Years of education: Not on file    Highest education level: Not on file   Occupational History    Not on file   Social Needs    Financial resource strain: Not on file    Food insecurity     Worry: Not on file     Inability: Not on file    Transportation needs     Medical: Not on file     Non-medical: Not on file   Tobacco Use    Smoking status: Never Smoker    Smokeless tobacco: Never Used   Substance and Sexual Activity    Alcohol use: No    Drug use: No    Sexual activity: Never   Lifestyle    Physical activity     Days per week: Not on file     Minutes per session: Not on file    Stress: Not on file   Relationships    Social connections     Talks on phone: Not on file     Gets together: Not on file     Attends [employer] service: Not on file     Active member of club or organization: Not on file     Attends meetings of clubs or organizations: Not on file     Relationship status: Not on file    Intimate partner violence     Fear of current or ex partner: Not on file     Emotionally abused: Not on file     Physically abused: Not on file     Forced sexual activity: Not on file   Other Topics Concern    Not on file   Social History Narrative    ** Merged History Encounter **             Family History:   No family history on file. . Otherwise non-pertinent to the chief complaint. REVIEW OF SYSTEMS:    The patient has profound mental retardation and is nonverbal.  She cannot provide any information. Otherwise, as in the HPI    PHYSICAL EXAM:    Vitals:   BP (!) 93/54   Pulse 81   Temp 97.9 °F (36.6 °C) (Axillary)   Resp 18   Ht 5' 2\" (1.575 m)   Wt 97 lb (44 kg)   SpO2 99%   BMI 17.74 kg/m²   Constitutional: The patient is lying in bed. Eyes are open. Nonverbal.  She does not even moan when turned. She is extremely contracted. And shows severe scoliosis. Skin: Warm and dry. No rashes were noted. HEENT: Eyes show round, and reactive pupils. No jaundice. Moist mucous membranes, no ulcerations, no thrush. Neck: Supple to movements. No lymphadenopathy. Chest: No use of accessory muscles to breathe. Symmetrical expansion. Auscultation reveals no wheezing, crackles, or rhonchi. Cardiovascular: S1 and S2 are rhythmic and regular. No murmurs appreciated. Abdomen: Positive bowel sounds to auscultation. Soft to palpation. Difficult to determine anatomy because of the scoliosis. The umbilicus, however, show some clear drainage. There is no surrounding erythema. Genitalia: There is a fair amount of drainage coming out of the vagina. It is a constant drip of yellowish fluid. There is an odor but it is not foul-smelling. Extremities: No clubbing, no cyanosis, no edema.   Lines: peripheral      CBC+dif:  Recent Labs     05/27/20  1858   WBC 22.0*   HGB 11.9   HCT 38.0   MCV 88.6      NEUTROABS 18.21*     No results found for: CRP   No results found for: CRPHS  No results found for: SEDRATE  Lab Results   Component Value Date    ALT 14 05/27/2020    AST 32 (H) 05/27/2020    ALKPHOS 137

## 2020-05-28 NOTE — PATIENT CARE CONFERENCE
P Quality Flow/Interdisciplinary Rounds Progress Note        Quality Flow Rounds held on May 28, 2020    Disciplines Attending:  Bedside Nurse, ,  and Nursing Unit Leadership    Jonnathan Barnes was admitted on 5/27/2020  5:54 PM    Anticipated Discharge Date:  Expected Discharge Date: 06/01/20    Disposition:    Von Score:  Von Scale Score: 10    Readmission Score:         Discussed patient goal for the day, patient clinical progression, and barriers to discharge.   The following Goal(s) of the Day/Commitment(s) have been identified:  CT today       Giovani Chun  May 28, 2020

## 2020-05-29 NOTE — PROGRESS NOTES
[address] [place] [address] [facility]  Progress Note    SUBJECTIVE:  Chief Complaint   Patient presents with    Vaginal Discharge     yellow    Dehydration     won't eat or drink x2 weeks    Fever     Nursing reports no new problems. Tolerating antibiotics. Review of systems:  As stated above in the chief complaint, otherwise negative. Medications:  Scheduled Meds:   calcium-vitamin D  1 tablet Oral TID    docusate sodium  100 mg Oral Nightly    lacosamide  150 mg Oral BID    levETIRAcetam  1,500 mg Oral BID    megestrol  40 mg Oral Daily    therapeutic multivitamin-minerals  1 tablet Oral Daily    senna  1 tablet Oral Nightly    polyethylene glycol  17 g Oral Daily    lubiprostone  24 mcg Oral BID WC    sodium chloride flush  10 mL Intravenous 2 times per day    metroNIDAZOLE  500 mg Intravenous Q8H    cefTRIAXone (ROCEPHIN) IV  2 g Intravenous Q24H     Continuous Infusions:   sodium chloride 75 mL/hr at 20 0623     PRN Meds:guaiFENesin-dextromethorphan, HYDROcodone-acetaminophen, sodium chloride flush, acetaminophen **OR** acetaminophen, polyethylene glycol, promethazine **OR** ondansetron    OBJECTIVE:  /66   Pulse 90   Temp 98.2 °F (36.8 °C) (Oral)   Resp 18   Ht 5' 2\" (1.575 m)   Wt 101 lb 8 oz (46 kg)   SpO2 97%   BMI 18.56 kg/m²   Temp  Av.7 °F (37.1 °C)  Min: 98.2 °F (36.8 °C)  Max: 99.1 °F (37.3 °C)  Constitutional: The patient is lying in bed. She is nonverbal and occasionally moaning. Contracted. Severe scoliosis. Skin: Warm and dry. No rashes were noted. HEENT: Round and reactive pupils. Moist mucous membranes. No ulcerations or thrush. Neck: Supple to movements. Chest: No use of accessory muscles to breathe. Symmetrical expansion. No wheezing, crackles or rhonchi. Cardiovascular: S1 and S2 are rhythmic and regular. No murmurs appreciated. Abdomen: Positive bowel sounds to auscultation. Benign to palpation.    Genitalia: Less

## 2020-05-29 NOTE — CONSULTS
allowing us to see her in consultation.         Shaniqua Montoya MD    D: 05/28/2020 13:32:52       T: 05/28/2020 14:49:01     [email or web address]  Job#: [id number]     Doc#: [id number]    CC:

## 2020-05-29 NOTE — CARE COORDINATION
Pt with extremely elevated CEA -19. Per hemonc note may require BX to determine malignancy source. Per LG pt is a full code. Prompting PCP for palliative care consult to establish goals of care with this information with LG. Per hemonc note; pt probably not a cnadidate for systemic treatment. Will follow. Kay Romero.

## 2020-05-30 NOTE — PROGRESS NOTES
exam.  Assessment and plan as outlined above and directed by me. Addition and corrections were done as deemed appropriate. My exam and plan include: The patient is tolerating antibiotics well. The vaginal discharge has decreased. We will request a PICC to continue IV antibiotics for the time being.     Flavia Alvarez  5/30/2020  3:08 PM

## 2020-05-30 NOTE — CONSULTS
Palliative Care Department  Palliative Care Initial Consult  Provider: [address] [place] [address] Day: 4    Referring Provider:  Dr. Yissel Vyas    Reason for Consult:  [x]  Code status Discussion  [x]  Assist with goals of care  []  Psychosocial support  []  Symptom Management  []  Advanced Care Planning    Chief Complaint: Melonie Dobbs is a 63 y.o. female with chief complaint of vaginal discharge    Assessment/Plan      Active Hospital Problems    Diagnosis Date Noted    Metastatic malignant neoplasm ([facility] [C79.9]     Leukocytosis [D72.829]     Dehydration [E86.0]     Seizure disorder ([facility] [G40.909]     Sepsis ([facility] [A41.9] 05/27/2020       Abdominal Mass/malignancy:   -  Oncology following   -  Per oncology, she is not a candidate for meaningful treatment    Palliative Care Encounter/Recommendations:      - Goals of care: to be determined     - Code Status: full code      -Call placed to guardian, awaiting return phone call     -We will address goals of care and CODE STATUS further when able to speak with the guardian      -At this time I would recommend a change of CODE STATUS to DNR CC and a consult for hospice with a plan to discharge back to group home with hospice care    Subjective:     HPI:  Melonie Dobbs is a 63 y.o. female with significant past medical history of mental retardation, who presented with vaginal discharge, accompanied by weakness, fatigue, decreased appetite, and weight loss. She was found to have a large complex cystic lesion in her lower abdmen, as well as cystic lesion of the live and mass of the liver. She has been seen by gynecology, oncology and ID. Oncology has stated that she is likely not a candidate for any meaningful treatment and recommend palliative care. Subjective/Events/Discussions:  5/30/2020:  Mrs. Powell seen at the bedside, no family present. She was awake, but nonverbal for me, and did not follow any my commands.   All of her history was compiled via tremors    Objective:     Physical Exam  BP (!) 90/48 Comment: manual  Pulse 90   Temp 98.3 °F (36.8 °C) (Oral)   Resp 18   Ht 5' 2\" (1.575 m)   Wt 101 lb 8 oz (46 kg)   SpO2 96%   BMI 18.56 kg/m²     Gen:  Alert, chronically ill-appearing, nonverbal  HEENT:  Normocephalic, conjunctiva pink, no drainage, mucosa moist  Neck:  Supple  Lungs:  CTA bilaterally, no audible rhonchi or wheezes noted  Heart:  RRR, no murmur, rub, or gallop noted during exam  Abd:  Soft, non tender, non distended, BS+  M/S/Ext: Weak, some slight contractures noted, no edema, pulses present  Skin:  Warm and dry  Neuro:  PERRL, Alert, not following commands, nonverbal for me    Current Medications:  Inpatient medications reviewed: yes  Home Medications reviewed: yes    Results/Verification of Data Review  Objective data reviewed: labs, images, records, medication use, vitals and chart      - Advanced Directives: She has a legal guardian   -Surrogate/Legal NOK: Guardian    Contacts:  Millicent Zurita ([phone]) is the legal guardian.    - Spiritual assessment: No spiritual distress identified     - Bereavement and grief: to be determined    - Discharge planning: to be determined    - Prognosis: Poor    - Referrals to: none today    Time/Communication  Greater than 50% of time spent, total 30 minutes in counseling and coordination of care at the bedside regarding goals of care, diagnosis and prognosis and see above. [facility]  Palliative Medicine    Discussed patient and the plan of care with the other IDT members of Palliative Care, and with patient and floor nurse. Thank you for allowing Palliative Medicine to participate in the care of [facility]. Note: This report was completed using computerAmobee voiced recognition software. Every effort has been made to ensure accuracy; however, inadvertent computerized transcription errors may be present.

## 2020-05-30 NOTE — PROGRESS NOTES
15.9*   RBC 4.29 3.75   HGB 11.9 10.4*   HCT 38.0 33.8*   MCV 88.6 90.1   MCH 27.7 27.7   MCHC 31.3* 30.8*   RDW 14.3 14.3    358   MPV 11.2 10.6            Radiology:   CT CHEST W CONTRAST   Final Result   Metastatic neoplasm to both lungs as well as mediastinum with a large   necrotic node in the aorticopulmonary window. Small bilateral pleural effusions. See above. US PELVIS COMPLETE   Final Result      Enlarged uterus with myomatous changes which is amenable for uterine   fibroid embolization. Thickened endometrial stripe. CT ABDOMEN PELVIS W IV CONTRAST Additional Contrast? None   Final Result   1. Findings for malignancy in the right lower quadrant, and widespread   hematogenous metastatic disease to the lung parenchyma. There is a   small suspicious nodule in the right lobe the liver could represent   also metastatic. 2. It is difficult to determine if it the large right lower quadrant   lesion of complex/cystic appearance is relate with the bone structures   or with the mesentery or being an exophytic lesion related with the   right ovary or a single large pedunculated necrotic myoma correlate   with the uterus which appears to be otherwise of unremarkable   appearance except by fluid contents within the endometrial cavity. ALERT:  ABNORMAL REPORT. XR CHEST PORTABLE   Final Result      No evidence for acute cardiopulmonary process. Assessment:    Active Problems:    Sepsis ([address]    Metastatic malignant neoplasm (HCC)    Leukocytosis    Dehydration    Seizure disorder ([address]  Resolved Problems:    * No resolved hospital problems. *      Plan:  1. Metastasis to liver and lungs and abdomen primary unclear at this time. Hem/onc following. Recommend palliative medicine due to pt not being a candidate for any meaningful tx. Palliative note reviewed  2. Leukocytosis/vaginal discharge/possible fistula. Continue abx per ID.  Gyn

## 2020-05-31 NOTE — PROGRESS NOTES
Subjective: The patient is awake in bed, non verbal, this is her baseline, does not follow commands. Afebrile with no acute events overnight. Objective:    /85   Pulse 88   Temp 98.6 °F (37 °C) (Oral)   Resp 24   Ht 5' 2\" (1.575 m)   Wt 101 lb 8 oz (46 kg)   SpO2 94%   BMI 18.56 kg/m²     General: NAD, does not follow commands. HEENT: No thrush or mucositis. Heart:  RRR, no murmurs, gallops, or rubs. Lungs:  CTA bilaterally, no wheeze, rales or rhonchi  Abd: bowel sounds present, nontender, nondistended, no masses  Extrem: +Contractures and poor muscle mass. No clubbing, cyanosis, or edema.    Lymphatics: No palpable adenopathy in cervical and supraclavicular regions  Skin: Intact, no petechia or purpura    CBC with Differential:    Lab Results   Component Value Date    WBC 14.5 05/31/2020    RBC 3.61 05/31/2020    HGB 10.1 05/31/2020    HCT 31.9 05/31/2020     05/31/2020    MCV 88.4 05/31/2020    MCH 28.0 05/31/2020    MCHC 31.7 05/31/2020    RDW 14.5 05/31/2020    SEGSPCT 60 03/24/2011    LYMPHOPCT 11.0 05/31/2020    MONOPCT 9.8 05/31/2020    EOSPCT 2.2 11/28/2018    BASOPCT 0.3 05/31/2020    MONOSABS 1.43 05/31/2020    LYMPHSABS 1.60 05/31/2020    EOSABS 0.11 05/31/2020    BASOSABS 0.04 05/31/2020     CMP:    Lab Results   Component Value Date     05/31/2020    K 3.4 05/31/2020    K 3.7 05/29/2020     05/31/2020    CO2 19 05/31/2020    BUN 5 05/31/2020    CREATININE 0.4 05/31/2020    GFRAA >60 05/31/2020    LABGLOM >60 05/31/2020    GLUCOSE 97 05/31/2020    GLUCOSE 98 03/24/2011    PROT 6.0 05/31/2020    LABALBU 2.4 05/31/2020    LABALBU 4.4 03/24/2011    CALCIUM 8.0 05/31/2020    BILITOT 0.3 05/31/2020    ALKPHOS 106 05/31/2020    AST 28 05/31/2020    ALT 9 05/31/2020        Current Facility-Administered Medications:     enoxaparin (LOVENOX) injection 40 mg, 40 mg, Subcutaneous, Daily, Munir Hric, DO, 40 mg at 05/31/20 1058    dextrose 5 % and 0.45 % sodium

## 2020-05-31 NOTE — PROGRESS NOTES
Symmetrical expansion. No wheezing, crackles or rhonchi. Cardiovascular: S1 and S2 are rhythmic and regular. No murmurs appreciated. Abdomen: Positive bowel sounds to auscultation. Benign to palpation. : no visible vaginal discharge  Extremities: Contracted. No edema.   Lines: peripheral    Laboratory and Tests Review:  Lab Results   Component Value Date    WBC 14.5 (H) 05/31/2020    WBC 15.9 (H) 05/29/2020    WBC 22.0 (H) 05/27/2020    HGB 10.1 (L) 05/31/2020    HCT 31.9 (L) 05/31/2020    MCV 88.4 05/31/2020     05/31/2020     Lab Results   Component Value Date    NEUTROABS 11.22 (H) 05/31/2020    NEUTROABS 12.53 (H) 05/29/2020    NEUTROABS 18.21 (H) 05/27/2020     No results found for: [facility]  Lab Results   Component Value Date    ALT 9 05/31/2020    AST 28 05/31/2020    ALKPHOS 106 (H) 05/31/2020    BILITOT 0.3 05/31/2020     Lab Results   Component Value Date     05/31/2020    K 3.4 05/31/2020    K 3.7 05/29/2020     05/31/2020    CO2 19 05/31/2020    BUN 5 05/31/2020    CREATININE 0.4 05/31/2020    CREATININE 0.5 05/29/2020    CREATININE 0.5 05/27/2020    GFRAA >60 05/31/2020    LABGLOM >60 05/31/2020    GLUCOSE 97 05/31/2020    GLUCOSE 98 03/24/2011    PROT 6.0 05/31/2020    LABALBU 2.4 05/31/2020    LABALBU 4.4 03/24/2011    CALCIUM 8.0 05/31/2020    BILITOT 0.3 05/31/2020    ALKPHOS 106 05/31/2020    AST 28 05/31/2020    ALT 9 05/31/2020     Lab Results   Component Value Date    CRP 8.2 (H) 05/28/2020     Lab Results   Component Value Date    SEDRATE 62 (H) 05/28/2020     Radiology:    Microbiology:   Wound culture labeled vulva (vaginal drainage) 5/28/2020 E coli, Corynebacterium  Blood culture 5/27/2020: Negative so far  resp viral panel neg  Genital cx neg      ASSESSMENT:  · 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

## 2020-06-01 NOTE — PROGRESS NOTES
Chart reviewed. Message left for patient's guardian. Guardian on call did call back however was unable to discuss code status change/hospice without contacting patient's primary guardian. Will await call back from patient's primary guardian.

## 2020-06-01 NOTE — CARE COORDINATION
Pt now has PICC; will require IV ATB's post D/C;will have LTAC evaluate for criteria; will require skilled care after discharge. Discharge plan; skilled vs LTAC. Await input from LTAC and follow. Jessie Mujica. Addendum; spoke with Ronald Begum from [facility]; pt DOES NOT  meet criteria for LTAC. will pursue [facility] choices through guardian. Jessie Mujica.

## 2020-06-01 NOTE — PROGRESS NOTES
Palliative on board, legal guardian must meet with medical board to discuss pts case and decide code status/plan of care from there. IV ABX per I.D, on Metronidazole and Ceftriaxone until 6/10. Will continue to follow.     Electronically signed by Yana Mak MD on 6/1/2020 at 5:32 PM     Attending Addendum:      Patient seen and examined personally. Agree with progress note as above which has been updated to reflect my changes. Palliative care candidate.     Gus Jarrell, [address] for [address] [facility]

## 2020-06-01 NOTE — PROGRESS NOTES
Metronidazole and Ceftriaxone until 6/10/2020  · Oncology following  · Has been seen by palliative    Alisha Chowdhury  1:43 PM  6/1/2020

## 2020-06-01 NOTE — PROGRESS NOTES
Received call back from [address] [place] [address], patient's legal guardian. Discussed patient's current condition and plan of care. Discussed, at length, DNR-CC/hospice care. Ifeoma states she is going to present patient's case to medical board and then they will decide most appropriate plan of care for patient. Bedside RN updated on plan. Patient to remain FULL CODE until medical board and guardian have decision on plan of care. Will continue to follow.

## 2020-06-01 NOTE — PROGRESS NOTES
Subjective: The patient is in bed, pt is non verbal, does not follow commands. Objective:    /76   Pulse 89   Temp 99.3 °F (37.4 °C) (Axillary)   Resp 22   Ht 5' 2\" (1.575 m)   Wt 110 lb 3 oz (50 kg)   SpO2 96%   BMI 20.15 kg/m²     General: NAD  HEENT: No thrush or mucositis, EOMI, PERRLA  Heart:  RRR, no murmurs, gallops, or rubs.   Lungs:  CTA bilaterally, no wheeze, rales or rhonchi  Abd: bowel sounds present, nontender, nondistended, no masses  Extrem:  No clubbing, cyanosis, or edema  Lymphatics: No palpable adenopathy in cervical and supraclavicular regions  Skin: Intact, no petechia or purpura    CBC with Differential:    Lab Results   Component Value Date    WBC 16.2 06/01/2020    RBC 3.23 06/01/2020    HGB 8.9 06/01/2020    HCT 28.6 06/01/2020     06/01/2020    MCV 88.5 06/01/2020    MCH 27.6 06/01/2020    MCHC 31.1 06/01/2020    RDW 14.7 06/01/2020    SEGSPCT 60 03/24/2011    LYMPHOPCT 8.6 06/01/2020    MONOPCT 9.3 06/01/2020    EOSPCT 2.2 11/28/2018    BASOPCT 0.4 06/01/2020    MONOSABS 1.51 06/01/2020    LYMPHSABS 1.40 06/01/2020    EOSABS 0.21 06/01/2020    BASOSABS 0.07 06/01/2020     CMP:    Lab Results   Component Value Date     06/01/2020    K 3.3 06/01/2020    K 3.7 05/29/2020     06/01/2020    CO2 21 06/01/2020    BUN 3 06/01/2020    CREATININE 0.4 06/01/2020    GFRAA >60 06/01/2020    LABGLOM >60 06/01/2020    GLUCOSE 112 06/01/2020    GLUCOSE 98 03/24/2011    PROT 6.0 05/31/2020    LABALBU 2.4 05/31/2020    LABALBU 4.4 03/24/2011    CALCIUM 7.8 06/01/2020    BILITOT 0.3 05/31/2020    ALKPHOS 106 05/31/2020    AST 28 05/31/2020    ALT 9 05/31/2020        Current Facility-Administered Medications:     enoxaparin (LOVENOX) injection 40 mg, 40 mg, Subcutaneous, Daily, Munir Hric, DO, 40 mg at 06/01/20 0937    dextrose 5 % and 0.45 % sodium chloride infusion, , Intravenous, Continuous, Munir Hric, DO, Last Rate: 75 mL/hr at 06/01/20 0146    levETIRAcetam (KEPPRA) 1,500 mg in sodium chloride 0.9 % 100 mL IVPB, 1,500 mg, Intravenous, Q12H, Munir Castorena, DO, Stopped at 06/01/20 0145    sodium chloride flush 0.9 % injection 10 mL, 10 mL, Intravenous, PRN, Skyler Hwang MD    heparin flush 100 UNIT/ML injection 300 Units, 3 mL, Intravenous, 2 times per day, Skyler Hwang MD, 300 Units at 05/31/20 2003    heparin flush 100 UNIT/ML injection 300 Units, 3 mL, Intercatheter, PRN, Skyler Hwang MD    calcium-vitamin D 500-200 MG-UNIT per tablet 1 tablet, 1 tablet, Oral, TID, Sidney Amin MD, 1 tablet at 05/28/20 2149    docusate sodium (COLACE) capsule 100 mg, 100 mg, Oral, Nightly, Audra Dias MD, 100 mg at 05/28/20 2144    guaiFENesin-dextromethorphan (ROBITUSSIN DM) 100-10 MG/5ML syrup 5 mL, 5 mL, Oral, TID PRN, Sidney Amin MD    HYDROcodone-acetaminophen (NORCO) 5-325 MG per tablet 1 tablet, 1 tablet, Oral, Q6H PRN, Sidney Amin MD, 1 tablet at 05/30/20 1647    lacosamide (VIMPAT) tablet 150 mg, 150 mg, Oral, BID, Audra Dias MD, 150 mg at 05/29/20 2038    megestrol (MEGACE) tablet 40 mg, 40 mg, Oral, Daily, Audra Dias MD, 40 mg at 05/29/20 0954    therapeutic multivitamin-minerals 1 tablet, 1 tablet, Oral, Daily, Audra Dias MD, 1 tablet at 05/30/20 1009    senna (SENOKOT) tablet 8.6 mg, 1 tablet, Oral, Nightly, Audra Dias MD, 8.6 mg at 05/30/20 2107    polyethylene glycol (GLYCOLAX) packet 17 g, 17 g, Oral, Daily, Audra Dias MD, 17 g at 06/01/20 0938    lubiprostone (AMITIZA) capsule 24 mcg, 24 mcg, Oral, BID WC, Audra Dias MD, 24 mcg at 05/30/20 1647    sodium chloride flush 0.9 % injection 10 mL, 10 mL, Intravenous, 2 times per day, Audra Dias MD, 10 mL at 05/31/20 2005    sodium chloride flush 0.9 % injection 10 mL, 10 mL, Intravenous, PRN, Audra Dias MD, 10 mL at 05/29/20 0711    acetaminophen (TYLENOL) tablet 650 mg, 650 mg, Oral, Q6H PRN, 650 mg at 05/28/20 2139 **OR** acetaminophen PM

## 2020-06-01 NOTE — CARE COORDINATION
Social Work / Discharge Planning : SW placed call to [address] for patient through [employer]. Patient has a PICC intact therefore she cannot return to [address] [place] [address]   and will need SNF placement. Await return call for SNF choices. Once received and facility accepts: PASSAR to be completed, submitted and patient will go to further review. SW to follow. Electronically signed by KIRSTEN Gibson on 6/1/20 at 11:34 AM EDT      Addendum : Meme Gooden LG returned SW call. SN[facility] discussed and [facility] obtained: [facility] and 3.) [facility]. Referral called to [facility] from [facility] [place]. Await response. Electronically signed by KIRSTEN Gibson on 6/1/20 at 12:52 PM EDT      Addendum : [facility] from [facility] stated [facility] does NOT have beds . Referral made to Allen Maher from [facility]. Await response. SW to follow.  Electronically signed by KIRSTEN Gibson on 6/1/20 at 1:02 PM EDT

## 2020-06-02 NOTE — PROGRESS NOTES
Chest: Good breath sounds. On room air. Cardiovascular: Heart sounds rhythmic and regular. Abdomen: Positive bowel sounds to auscultation. Benign to palpation. : No discharge. Extremities: Contracted. No edema. Lines: Right PICC 5/31/2020    Laboratory and Tests Review:  Lab Results   Component Value Date    WBC 15.7 (H) 06/02/2020    WBC 16.2 (H) 06/01/2020    WBC 14.5 (H) 05/31/2020    HGB 9.4 (L) 06/02/2020    HCT 29.9 (L) 06/02/2020    MCV 88.7 06/02/2020     06/02/2020     Lab Results   Component Value Date    NEUTROABS 12.72 (H) 06/02/2020    NEUTROABS 12.91 (H) 06/01/2020    NEUTROABS 11.22 (H) 05/31/2020     No results found for: [facility]  Lab Results   Component Value Date    ALT 9 05/31/2020    AST 28 05/31/2020    ALKPHOS 106 (H) 05/31/2020    BILITOT 0.3 05/31/2020     Lab Results   Component Value Date     06/01/2020    K 3.3 06/01/2020    K 3.7 05/29/2020     06/01/2020    CO2 21 06/01/2020    BUN 3 06/01/2020    CREATININE 0.4 06/01/2020    CREATININE 0.4 05/31/2020    CREATININE 0.5 05/29/2020    GFRAA >60 06/01/2020    LABGLOM >60 06/01/2020    GLUCOSE 112 06/01/2020    GLUCOSE 98 03/24/2011    PROT 6.0 05/31/2020    LABALBU 2.4 05/31/2020    LABALBU 4.4 03/24/2011    CALCIUM 7.8 06/01/2020    BILITOT 0.3 05/31/2020    ALKPHOS 106 05/31/2020    AST 28 05/31/2020    ALT 9 05/31/2020     Lab Results   Component Value Date    CRP 8.2 (H) 05/28/2020     Lab Results   Component Value Date    SEDRATE 57 (H) 05/28/2020     Radiology:  Reviewed   Microbiology:   Wound culture labeled vulva (vaginal drainage) 5/28/2020 E coli, Corynebacterium, anaerobic GNR/GPR.   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

## 2020-06-03 PROBLEM — E43 SEVERE PROTEIN-CALORIE MALNUTRITION (HCC): Status: ACTIVE | Noted: 2020-01-01

## 2020-06-03 NOTE — PROGRESS NOTES
[facility] Progress Note    Admitting Date and Time: 5/27/2020  5:54 PM  Admit Dx: Sepsis ([facility] [A41.9]    Subjective:  Patient is being followed for Sepsis [facility]) [A41.9]     The patient is making noises with her mouth this morning. She does not follow commands.      dronabinol  2.5 mg Oral BID    enoxaparin  40 mg Subcutaneous Daily    levetiracetam  1,500 mg Intravenous Q12H    heparin flush  3 mL Intravenous 2 times per day    calcium-vitamin D  1 tablet Oral TID    docusate sodium  100 mg Oral Nightly    lacosamide  150 mg Oral BID    therapeutic multivitamin-minerals  1 tablet Oral Daily    senna  1 tablet Oral Nightly    polyethylene glycol  17 g Oral Daily    lubiprostone  24 mcg Oral BID WC    sodium chloride flush  10 mL Intravenous 2 times per day    metroNIDAZOLE  500 mg Intravenous Q8H    cefTRIAXone (ROCEPHIN) IV  2 g Intravenous Q24H     morphine, 1 mg, Q4H PRN  sodium chloride flush, 10 mL, PRN  heparin flush, 3 mL, PRN  guaiFENesin-dextromethorphan, 5 mL, TID PRN  HYDROcodone-acetaminophen, 1 tablet, Q6H PRN  sodium chloride flush, 10 mL, PRN  acetaminophen, 650 mg, Q6H PRN    Or  acetaminophen, 650 mg, Q6H PRN  polyethylene glycol, 17 g, Daily PRN  promethazine, 12.5 mg, Q6H PRN    Or  ondansetron, 4 mg, Q6H PRN         Objective:    /61   Pulse 102   Temp 99 °F (37.2 °C)   Resp 24   Ht 5' 2\" (1.575 m)   Wt 111 lb 1.6 oz (50.4 kg)   SpO2 96%   BMI 20.32 kg/m²     Gen: NAD, making noises with her mouth, does not follow commands  HEENT: NCAT  CV: Tachycardia, no m/r/g  Resp: Equal chest rise b/l, CTAB  Abd: Soft, flinching to abdominal palpation, ND  Ext: Extremities contracted, no BLE edema    Recent Labs     06/01/20  0430      K 3.3*   *   CO2 21*   BUN 3*   CREATININE 0.4*   GLUCOSE 112*   CALCIUM 7.8*       Recent Labs     06/01/20  0430 06/02/20  0615   WBC 16.2* 15.7*   RBC 3.23* 3.37*   HGB 8.9* 9.4*   HCT 28.6* 29.9*   MCV 88.5 88.7 MCH 27.6 27.9   MCHC 31.1* 31.4*   RDW 14.7 15.1*    337   MPV 10.8 10.9     Assessment:    Active Problems:    Sepsis (HCC)    Metastatic malignant neoplasm (HCC)    Leukocytosis    Dehydration    Seizure disorder (HCC)    Acidosis    Severe protein-calorie malnutrition (HCC)  Resolved Problems:    * No resolved hospital problems. *      Plan:    Large RLQ abdominal mass with metastasis to the lungs and liver: Very large 11 x 9 x 12 cm mass. Heme/onc following and treatment not recommended at this time. Palliative care following. Continue adequate pain control. Continue Rocephin and Flagyl per ID. Awaiting CBC. Continue IV fluids. Will attempt to reach POA and guardian to discuss plan of care and code status. Seizure disorder: Continue home Vimpat and Keppra. Malnutrition: Continue IV fluids with a dysphagia puréed diet. Continue Marinol. DVT Prophylaxis: Lovenox  Diet: Dysphagia pureed    NOTE: This report was transcribed using voice recognition software. Every effort was made to ensure accuracy; however, inadvertent computerized transcription errors may be present.   Electronically signed by Etta Remy MD on 6/3/2020 at 4:26 PM

## 2020-06-03 NOTE — CARE COORDINATION
Dietary to eval today re; dietary recommendations. awaiting approval from [facility] for [facility] to complete IV atb's. Will follow. Jerry Rolon.

## 2020-06-03 NOTE — PROGRESS NOTES
[facility] Quality Flow/Interdisciplinary Rounds Progress Note        Quality Flow Rounds held on Velma 3, 2020    Disciplines Attending:  Bedside Nurse, ,  and Nursing Unit Leadership    Jeannette Reid was admitted on 5/27/2020  5:54 PM    Anticipated Discharge Date:  Expected Discharge Date: 06/01/20    Disposition:    Von Score:  Von Scale Score: 9    Readmission Risk              Risk of Unplanned Readmission:        22           Discussed patient goal for the day, patient clinical progression, and barriers to discharge.   The following Goal(s) of the Day/Commitment(s) have been identified:  await review for placement      Nickie Camilo  Velma 3, 2020

## 2020-06-03 NOTE — PROGRESS NOTES
Evaluation:   · Evaluation: Goals set   · Goals: PO intake >50% of meals/ONS.      · Monitoring: Meal Intake, Supplement Intake, Diet Tolerance, Skin Integrity, Wound Healing, I&O, Mental Status/Confusion, Weight, Pertinent Labs, Chewing/Swallowing, Monitor Bowel Function      Electronically signed by Rivas Kern RD, LD on 6/3/20 at 3:42 PM EDT    Contact Number: ext [phone]

## 2020-06-04 NOTE — PROGRESS NOTES
thrush. Neck: Supple to movements. Chest: Good breath sounds. On room air. Cardiovascular: Heart sounds rhythmic and regular. Abdomen: Positive bowel sounds to auscultation. : No discharge. Extremities: Contracted. No edema. Lines: Left PICC 5/31/2020    Laboratory and Tests Review:  Lab Results   Component Value Date    WBC 12.0 (H) 06/04/2020    WBC 15.7 (H) 06/02/2020    WBC 16.2 (H) 06/01/2020    HGB 9.6 (L) 06/04/2020    HCT 30.2 (L) 06/04/2020    MCV 87.8 06/04/2020     06/04/2020     Lab Results   Component Value Date    NEUTROABS 8.95 (H) 06/04/2020    NEUTROABS 12.72 (H) 06/02/2020    NEUTROABS 12.91 (H) 06/01/2020     No results found for: [facility]  Lab Results   Component Value Date    ALT 10 06/04/2020    AST 30 06/04/2020    ALKPHOS 85 06/04/2020    BILITOT 0.2 06/04/2020     Lab Results   Component Value Date     06/04/2020    K 2.9 06/04/2020    K 3.7 05/29/2020     06/04/2020    CO2 22 06/04/2020    BUN <2 06/04/2020    CREATININE 0.4 06/04/2020    CREATININE 0.4 06/01/2020    CREATININE 0.4 05/31/2020    GFRAA >60 06/04/2020    LABGLOM >60 06/04/2020    GLUCOSE 105 06/04/2020    GLUCOSE 98 03/24/2011    PROT 5.4 06/04/2020    LABALBU 2.2 06/04/2020    LABALBU 4.4 03/24/2011    CALCIUM 8.0 06/04/2020    BILITOT 0.2 06/04/2020    ALKPHOS 85 06/04/2020    AST 30 06/04/2020    ALT 10 06/04/2020     Lab Results   Component Value Date    CRP 8.2 (H) 05/28/2020     Lab Results   Component Value Date    SEDRATE 57 (H) 05/28/2020     Radiology:  Reviewed     Microbiology:   Wound culture labeled vulva (vaginal drainage) 5/28/2020 E coli, Corynebacterium, anaerobic GNR/GPR.   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 I had a face to face encounter with the patient. Agree with exam.  Assessment and plan as outlined above and directed by me. Addition and corrections were done as deemed appropriate. My exam and plan include: Although the white count is improving it is not normal yet. She is going to be on antibiotics for less than another week more. I will go ahead and repeat a CT of the abdomen and pelvis without contrast to see what the status of the mass is.     Tai Jang  6/4/2020  4:16 PM

## 2020-06-04 NOTE — PROGRESS NOTES
Chart reviewed-plan for PEG tomorrow, DNR paperwork completed and faxed to guardian. Guardian called to further discuss plan of care-message left. Awaiting call back. Will continue to follow.

## 2020-06-04 NOTE — PROGRESS NOTES
Dr. Huy Martinez notified of PEG consent. Order to consult Dr. Shelia Brody. Message via service for new consult to Dr. Shelia Brody.

## 2020-06-04 NOTE — CARE COORDINATION
Spoke by phone with pt's guardian Ifeoma Breaux([phone]) re; required information that needs to be completed in order to pursue DNR status. They request that a second physician signature be added to page  4 of DNR request form. An NP or PA can sign in addition to MD.left message to speak with [facility] NP Yahaira Lei. To further discuss above needed documentation. Await call back. Jerry Link. Received call back from Ze Persons; discussed above needed information; Ze Persons will review and address. will follow. Jerry Link.

## 2020-06-04 NOTE — PROGRESS NOTES
Message left for Pretty Oropeza, legal guardian, @ [phone], requesting consent for PEG per earlier conversation with Dr. Jose Bennett.

## 2020-06-04 NOTE — CONSULTS
GENERAL SURGERY  CONSULT NOTE  6/4/2020    Physician Consulted: Dr. Nasrin Lang  Reason for Consult: PEG  Referring Physician: Dr. Forrestine Ahumada    Chief Complaint   Patient presents with    Vaginal Discharge     yellow    Dehydration     won't eat or drink x2 weeks    Fever     HPI  Jonnathan Barnes is a 63 y.o. female with history of MRDD who was sent in from facility with dehydration, decreased appetite, and vaginal discharge. Found to have a large pelvic mass with concern for liver and lung metastases. PEG has been requested for nutritional support. She is nonverbal and unable to provide any history. Past Medical History:   Diagnosis Date    Amenorrhea     Anemia     Constipation     Developmental disability     Dry eyes     Encephalopathy     Kyphoscoliosis     Leukocytosis     Menopausal and perimenopausal disorder     Mental retardation     Osteoporosis     Seizure disorder ([facility]     Seizures (HCC)        Past Surgical History:   Procedure Laterality Date    INSERT PICC LINE  5/31/2020            Medications Prior to Admission:    Prior to Admission medications    Medication Sig Start Date End Date Taking? Authorizing Provider   polycarbophil (FIBERCON) 625 MG tablet Take 625 mg by mouth 2 times daily   Yes Historical Provider, MD   HYDROcodone-acetaminophen (NORCO) 5-325 MG per tablet Take 1 tablet by mouth every 6 hours as needed for Pain.    Yes Historical Provider, MD   acetaminophen (TYLENOL) 325 MG tablet Take 650 mg by mouth every 6 hours as needed for Pain   Yes Historical Provider, MD   Lactobacillus (ACIDOPHILUS/BIFIDUS PO) Take 1 tablet by mouth daily   Yes Historical Provider, MD   levETIRAcetam (KEPPRA) 750 MG tablet Take 2 tablets by mouth 2 times daily 1/12/16  Yes Carmenza Ya,    alendronate (FOSAMAX) 70 MG tablet Take 70 mg by mouth every 7 days Wednesday   Yes Historical Provider, MD   Multiple Vitamins-Minerals (THERAPEUTIC MULTIVITAMIN-MINERALS) tablet Take 1 tablet by mouth

## 2020-06-04 NOTE — PROGRESS NOTES
[facility] Progress Note    Admitting Date and Time: 5/27/2020  5:54 PM  [facility] [A41.9]    Subjective:  Patient is being followed for [facility] [A41.9]     The patient has not been eating much. Per nursing staff, some of the food and medications that she takes just go out the side of her mouth.      potassium chloride  10 mEq Intravenous Q1H    dronabinol  2.5 mg Oral BID    enoxaparin  40 mg Subcutaneous Daily    levetiracetam  1,500 mg Intravenous Q12H    heparin flush  3 mL Intravenous 2 times per day    calcium-vitamin D  1 tablet Oral TID    docusate sodium  100 mg Oral Nightly    lacosamide  150 mg Oral BID    therapeutic multivitamin-minerals  1 tablet Oral Daily    senna  1 tablet Oral Nightly    polyethylene glycol  17 g Oral Daily    lubiprostone  24 mcg Oral BID WC    sodium chloride flush  10 mL Intravenous 2 times per day    metroNIDAZOLE  500 mg Intravenous Q8H    cefTRIAXone (ROCEPHIN) IV  2 g Intravenous Q24H     morphine, 1 mg, Q4H PRN  sodium chloride flush, 10 mL, PRN  heparin flush, 3 mL, PRN  guaiFENesin-dextromethorphan, 5 mL, TID PRN  HYDROcodone-acetaminophen, 1 tablet, Q6H PRN  sodium chloride flush, 10 mL, PRN  acetaminophen, 650 mg, Q6H PRN    Or  acetaminophen, 650 mg, Q6H PRN  polyethylene glycol, 17 g, Daily PRN  promethazine, 12.5 mg, Q6H PRN    Or  ondansetron, 4 mg, Q6H PRN         Objective:    BP (!) 132/94   Pulse 85   Temp 98.1 °F (36.7 °C) (Axillary)   Resp 22   Ht 5' 2\" (1.575 m)   Wt 111 lb 1.6 oz (50.4 kg)   SpO2 97%   BMI 20.32 kg/m²     Gen: NAD, alert, lying in bed  HEENT: NCAT  CV: Mild tachycardia, no m/r/g  Resp: Equal chest rise b/l, CTAB  Abd: Soft, no grimace to palpation, ND  Ext: Extremities contracted, no BLE edema    Recent Labs     06/04/20  0428      K 2.9*      CO2 22   BUN <2*   CREATININE 0.4*   GLUCOSE 105*   CALCIUM 8.0*       Recent Labs     06/02/20  0615 06/04/20  0410   WBC 15.7*

## 2020-06-05 NOTE — DISCHARGE INSTR - COC
Barium Swallow with Video (Video Swallowing Test): {[facility] Done [facility]:629506313}    Treatments at the Time of Hospital Discharge:   Respiratory Treatments: ***  Oxygen Therapy:  {Therapy; copd oxygen:64676}  Ventilator:    {[facility] Vent AJCP:283588425}    Rehab Therapies: {THERAPEUTIC INTERVENTION:1097697075}  Weight Bearing Status/Restrictions: {[facility] Weight Bearin}  Other Medical Equipment (for information only, NOT a DME order):  {EQUIPMENT:087566051}  Other Treatments: ***    Patient's personal belongings (please select all that are sent with patient):  {[facility] DME Belongings:232458322}    RN SIGNATURE:  {Esignature:301563752}    CASE MANAGEMENT/SOCIAL WORK SECTION    Inpatient Status Date: ***    Readmission Risk Assessment Score:  Readmission Risk              Risk of Unplanned Readmission:        21           Discharging to Facility/ Agency   · Name: [facility]   · [address] [place] [address]   · Phone:  · Fax:    Dialysis Facility (if applicable)   · Name:  · Address:  · Dialysis Schedule:  · Phone:  · Fax:    / signature: {Esignature:[id number]} Electronically signed by KIRSTEN Herman on 20 at 12:44 PM EDT      PHYSICIAN SECTION    Prognosis: {Prognosis:2339444786}    Condition at Discharge: Stable    Rehab Potential (if transferring to Rehab): {Prognosis:3306215380}    Recommended Labs or Other Treatments After Discharge: ***    Physician Certification: I certify the above information and transfer of Kenyatta Morales  is necessary for the continuing treatment of the diagnosis listed and that she requires [facility] for greater 30 days.      Update Admission H&P: {[facility] DME Changes in ABQCV:513777818}    PHYSICIAN SIGNATURE:  {Esignature:398263966}

## 2020-06-05 NOTE — CARE COORDINATION
Social Work / Discharge Planning :SW/ CM actively working on discharge planning for patient. Patient nutritional status is being addressed and unfortunately, the original plan for a peg tube cannot be placed due to tumor: currently looking at other options. Meanwhile; SW and CM spoke to RN at [facility] for better [employer] and initially they COULD accept patient back with TF HOWEVER, they will NOT be able to for at least 30 days due to transitioning patient from her home to group home that services  care. Therefore, plan will remain to [facility]. SW never received return call nor fax from [facility] in regards to patient approval level from [place] . JODIE placed second call [phone] and spoke to Tahira and she transfered SW to [place]. SW left detailed VM in regards to obtaining approval. ONCE SW receives this, then the LOC can be submitted for SNF placement. JODIE to follow. Electronically signed by KIRSTEN Castorena on 6/5/20 at 12:40 PM EDT      Addendum : JODIE received approval letter from [facility] giving approval for SNF. Once its determined regarding treatment( peg tube) then JODIE will complete paper N 17 and fax to [facility] office [phone] for LOC approval. Nya Leonards will be received day faxed. JODIE to follow. Electronically signed by KIRSTEN Castorena on 6/5/20 at 2:51 PM EDT      Addendum : JODIE updated by Charge RN in regards to paperwork received from [facility] agency :DNR status with Hospice consult. JODIE placed call to [facility]. and left VM to call SW or unit with Hospice choice. Once Hospice choice received from LG then referral can be called out. Await response. JODIE to follow. Electronically signed by KIRSTEN Castorena on 6/5/20 at 3:07 PM EDT      Addendum :Juan Plasencia LG from [facility] returned SW call and Hospice choices discussed. They would like referral called to [facility]. Hospice consult noted and SW called [facility] and made referral to [facility]. AWait Hospice meeting with LG .  JODIE did fax

## 2020-06-05 NOTE — OP NOTE
Operative Note      Patient: Xochitl Schreiber  YOB: 1955  MRN: [id number]    Date of Procedure: 6/5/2020    Pre-Op Diagnosis: Failure to thrive, pelvic mass    Post-Op Diagnosis: same       Procedure(s):  EGD     Surgeon(s):  Maikel Rojas MD    Assistant:   * No surgical staff found *    Anesthesia: Monitor Anesthesia Care    Estimated Blood Loss (mL): 0    Complications: None    Specimens:   * No specimens in log *    Implants:  * No implants in log *      Drains:   External Urinary Catheter (Active)   Catheter changed  Yes 5/29/2020  9:00 PM   Suction- Female Only 40 mmgHg continuous 5/29/2020  9:00 PM   Placement Initiated 5/29/2020  9:00 PM   Skin Assessment No Injury 5/29/2020  9:00 PM       Findings: Unable to obtain 1:1 palpation or visualize transillumination    INDICATIONS:  This is a 63 y.o. MRDD female who presents with failure to take adequate PO and a pelvic mass with possible metastatic disease. My recommendation is to proceed with esophagogastroduodenoscopy/ PEG tube placement. The patient's guardian was advised of the risks, benefits, complications and options including the risk of bleeding and perforation. They understood and agreed to proceed. DETAILS OF PROCEDURE:     [facility], the patient was positioned in the supine position. A bite block was inserted. Under direct visualization the scope was passed through the oral cavity, into the esophagus and then into the stomach. The scope was then passed through a normal appearing pylorus into the duodenum. The proximal duodenum and bulb were unremarkable. The scope was pulled back into the stomach and retroflexed. Visualization of the fundus was unremarkable. The scope was then straightened and the distal stomach was inspected. I was not able to obtain a one to one palpation or transillumination with the scope in the distal stomach. PEG attempt was aborted. The stomach was desufflated. The endoscope was removed. Patient tolerated the procedure well and was taken to PACU in stable condition.     Electronically signed by Sajan Patel MD on 6/5/2020 at 8:25 AM

## 2020-06-06 NOTE — PLAN OF CARE
Problem: Confusion - Acute:  Goal: Absence of continued neurological deterioration signs and symptoms  Description: Absence of continued neurological deterioration signs and symptoms  5/29/2020 2131 by Devi Beltrán RN  Outcome: Met This Shift  5/29/2020 1801 by America Rhodes RN  Outcome: Met This Shift  5/29/2020 1451 by Navid Jean-Baptiste RN  Outcome: Ongoing     Problem: Discharge Planning:  Goal: Ability to perform activities of daily living will improve  Description: Ability to perform activities of daily living will improve  5/29/2020 2131 by Devi Beltrán RN  Outcome: Not Met This Shift  5/29/2020 1451 by Navid Jean-Baptiste RN  Outcome: Ongoing     Problem: Injury - Risk of, Physical Injury:  Goal: Absence of physical injury  Description: Absence of physical injury  5/29/2020 2131 by Devi Beltrán RN  Outcome: Met This Shift  5/29/2020 1801 by America Rhodes RN  Outcome: Met This Shift  5/29/2020 1451 by Navid Jean-Baptiste RN  Outcome: Ongoing
Problem: Confusion - Acute:  Goal: Absence of continued neurological deterioration signs and symptoms  Description: Absence of continued neurological deterioration signs and symptoms  6/3/2020 2334 by Dagoberto Gill RN  Outcome: Met This Shift  6/3/2020 1752 by Karol Simon RN  Outcome: Met This Shift     Problem: Discharge Planning:  Goal: Ability to perform activities of daily living will improve  Description: Ability to perform activities of daily living will improve  6/3/2020 2334 by Dagoberto Gill RN  Outcome: Met This Shift  6/3/2020 1752 by Karol Simon RN  Outcome: Met This Shift
Problem: Confusion - Acute:  Goal: Absence of continued neurological deterioration signs and symptoms  Description: Absence of continued neurological deterioration signs and symptoms  Outcome: Met This Shift  Goal: Mental status will be restored to baseline  Description: Mental status will be restored to baseline  Outcome: Met This Shift     Problem: Discharge Planning:  Goal: Ability to perform activities of daily living will improve  Description: Ability to perform activities of daily living will improve  Outcome: Met This Shift  Goal: Participates in care planning  Description: Participates in care planning  Outcome: Met This Shift     Problem: Injury - Risk of, Physical Injury:  Goal: Absence of physical injury  Description: Absence of physical injury  Outcome: Met This Shift  Goal: Will remain free from falls  Description: Will remain free from falls  Outcome: Met This Shift     Problem: Mood - Altered:  Goal: Mood stable  Description: Mood stable  Outcome: Met This Shift  Goal: Absence of abusive behavior  Description: Absence of abusive behavior  Outcome: Met This Shift  Goal: Verbalizations of feeling emotionally comfortable while being cared for will increase  Description: Verbalizations of feeling emotionally comfortable while being cared for will increase  Outcome: Met This Shift     Problem: Psychomotor Activity - Altered:  Goal: Absence of psychomotor disturbance signs and symptoms  Description: Absence of psychomotor disturbance signs and symptoms  Outcome: Met This Shift     Problem: Sensory Perception - Impaired:  Goal: Demonstrations of improved sensory functioning will increase  Description: Demonstrations of improved sensory functioning will increase  Outcome: Met This Shift  Goal: Decrease in sensory misperception frequency  Description: Decrease in sensory misperception frequency  Outcome: Met This Shift  Goal: Able to refrain from responding to false sensory perceptions  Description: Able to
refrain from responding to false sensory perceptions  Outcome: Met This Shift  Goal: Demonstrates accurate environmental perceptions  Description: Demonstrates accurate environmental perceptions  Outcome: Met This Shift  Goal: Able to distinguish between reality-based and nonreality-based thinking  Description: Able to distinguish between reality-based and nonreality-based thinking  Outcome: Met This Shift  Goal: Able to interrupt nonreality-based thinking  Description: Able to interrupt nonreality-based thinking  Outcome: Met This Shift     Problem: Sleep Pattern Disturbance:  Goal: Appears well-rested  Description: Appears well-rested  Outcome: Met This Shift     Problem: Daily Care:  Goal: Daily care needs are met  Description: Daily care needs are met  Outcome: Met This Shift     Problem: Pain:  Goal: Pain level will decrease  Description: Pain level will decrease  Outcome: Met This Shift  Goal: Control of acute pain  Description: Control of acute pain  Outcome: Met This Shift  Goal: Control of chronic pain  Description: Control of chronic pain  Outcome: Met This Shift

## 2020-06-06 NOTE — PROGRESS NOTES
[address] [place] [address] [facility]  Progress Note  Face to face encounter   SUBJECTIVE:  Chief Complaint   Patient presents with    Vaginal Discharge     yellow    Dehydration     won't eat or drink x2 weeks    Fever     Temp max 99.0 overnight. Nonverbal, no meaningful communication    Review of systems:  As stated above in the chief complaint, otherwise negative. Medications:  Scheduled Meds:   alteplase  2 mg Intracatheter Once    dronabinol  2.5 mg Oral BID    enoxaparin  40 mg Subcutaneous Daily    levetiracetam  1,500 mg Intravenous Q12H    heparin flush  3 mL Intravenous 2 times per day    calcium-vitamin D  1 tablet Oral TID    docusate sodium  100 mg Oral Nightly    lacosamide  150 mg Oral BID    therapeutic multivitamin-minerals  1 tablet Oral Daily    senna  1 tablet Oral Nightly    polyethylene glycol  17 g Oral Daily    lubiprostone  24 mcg Oral BID WC    sodium chloride flush  10 mL Intravenous 2 times per day    metroNIDAZOLE  500 mg Intravenous Q8H    cefTRIAXone (ROCEPHIN) IV  2 g Intravenous Q24H     Continuous Infusions:   dextrose 5 % and 0.45 % NaCl 75 mL/hr at 20 0745     PRN Meds:morphine, sodium chloride flush, heparin flush, guaiFENesin-dextromethorphan, HYDROcodone-acetaminophen, sodium chloride flush, acetaminophen **OR** acetaminophen, polyethylene glycol, promethazine **OR** ondansetron    OBJECTIVE:  BP (!) 111/55   Pulse 103   Temp 99 °F (37.2 °C) (Oral)   Resp 18   Ht 5' 2\" (1.575 m)   Wt 117 lb (53.1 kg)   SpO2 97%   BMI 21.40 kg/m²   Temp  Av °F (37.2 °C)  Min: 98.9 °F (37.2 °C)  Max: 99 °F (37.2 °C)  Constitutional: The patient is lying in bed. She is nonverbal, does not respond to voice. Contracted. Severe scoliosis. No distress. Eyes open- no distress. Skin: Warm and dry. No rashes were noted. HEENT: Round and reactive pupils. Dry mucous membranes. No ulcerations or thrush. Neck: Supple to movements.    Chest: CTA. On room air. Cardiovascular: Heart sounds rhythmic and regular. Abdomen: Positive bowel sounds to auscultation. : No discharge. Extremities: Contracted. No edema. Lines: Left PICC 5/31/2020    Laboratory and Tests Review:  Lab Results   Component Value Date    WBC 16.0 (H) 06/06/2020    WBC 15.0 (H) 06/05/2020    WBC 12.0 (H) 06/04/2020    HGB 9.5 (L) 06/06/2020    HCT 30.2 (L) 06/06/2020    MCV 87.5 06/06/2020     06/06/2020     Lab Results   Component Value Date    NEUTROABS 11.61 (H) 06/06/2020    NEUTROABS 10.89 (H) 06/05/2020    NEUTROABS 8.95 (H) 06/04/2020     No results found for: [facility]  Lab Results   Component Value Date    ALT 10 06/04/2020    AST 30 06/04/2020    ALKPHOS 85 06/04/2020    BILITOT 0.2 06/04/2020     Lab Results   Component Value Date     06/04/2020    K 3.9 06/04/2020     06/04/2020    CO2 21 06/04/2020    BUN <2 06/04/2020    CREATININE 0.4 06/04/2020    CREATININE 0.4 06/04/2020    CREATININE 0.4 06/01/2020    GFRAA >60 06/04/2020    LABGLOM >60 06/04/2020    GLUCOSE 88 06/04/2020    GLUCOSE 98 03/24/2011    PROT 5.4 06/04/2020    LABALBU 2.2 06/04/2020    LABALBU 4.4 03/24/2011    CALCIUM 8.1 06/04/2020    BILITOT 0.2 06/04/2020    ALKPHOS 85 06/04/2020    AST 30 06/04/2020    ALT 10 06/04/2020     Lab Results   Component Value Date    CRP 8.2 (H) 05/28/2020     Lab Results   Component Value Date    SEDRATE 57 (H) 05/28/2020     Radiology:    Microbiology:   Wound culture labeled vulva (vaginal drainage) 5/28/2020 E coli, Corynebacterium, anaerobic GNR/GPR.   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

## 2020-06-07 NOTE — PROGRESS NOTES
[facility] Progress Note    Admitting Date and Time: 5/27/2020  5:54 PM  Admit Dx: Sepsis ([facility] [A41.9]    Subjective:  Patient is being followed for Sepsis [facility]) [A41.9]     The patient is nonverbal and is not following commands.      alteplase  2 mg Intracatheter Once    dronabinol  2.5 mg Oral BID    enoxaparin  40 mg Subcutaneous Daily    levetiracetam  1,500 mg Intravenous Q12H    heparin flush  3 mL Intravenous 2 times per day    calcium-vitamin D  1 tablet Oral TID    docusate sodium  100 mg Oral Nightly    lacosamide  150 mg Oral BID    therapeutic multivitamin-minerals  1 tablet Oral Daily    senna  1 tablet Oral Nightly    polyethylene glycol  17 g Oral Daily    lubiprostone  24 mcg Oral BID WC    sodium chloride flush  10 mL Intravenous 2 times per day    metroNIDAZOLE  500 mg Intravenous Q8H    cefTRIAXone (ROCEPHIN) IV  2 g Intravenous Q24H     morphine, 1 mg, Q4H PRN  sodium chloride flush, 10 mL, PRN  heparin flush, 3 mL, PRN  guaiFENesin-dextromethorphan, 5 mL, TID PRN  HYDROcodone-acetaminophen, 1 tablet, Q6H PRN  sodium chloride flush, 10 mL, PRN  acetaminophen, 650 mg, Q6H PRN    Or  acetaminophen, 650 mg, Q6H PRN  polyethylene glycol, 17 g, Daily PRN  promethazine, 12.5 mg, Q6H PRN    Or  ondansetron, 4 mg, Q6H PRN         Objective:    /63   Pulse 50   Temp 100.4 °F (38 °C) (Axillary)   Resp 24   Ht 5' 2\" (1.575 m)   Wt 117 lb (53.1 kg)   SpO2 94%   BMI 21.40 kg/m²     Gen: NAD, alert, lying in bed  HEENT: NCAT  CV: Tachycardia, no m/r/g  Resp: Equal chest rise b/l, mild tachypnea, decreased breath sounds at b/l bases  Abd: Soft, no grimace to palpation, ND  Ext: Extremities contracted, no BLE edema    Recent Labs     06/04/20  1850      K 3.9      CO2 21*   BUN <2*   CREATININE 0.4*   GLUCOSE 88   CALCIUM 8.1*       Recent Labs     06/05/20  1000 06/06/20  0325   WBC 15.0* 16.0*   RBC 3.48* 3.45*   HGB 9.6* 9.5*   HCT 30.4* 30.2*   MCV

## 2020-06-08 NOTE — PROGRESS NOTES
Nutrition Assessment    Type and Reason for Visit: Reassess    Nutrition Recommendations: Continue Diet. Will Modify Current ONS to Ensure High Emeka ONS TID. Noted failed PEG placement 2/2 tumor location. Next option would be to start PN feedings 2/2 PEG unable to be placed however Hospice plan noted at this time. Please consult if EN/PN rec's needed. Nutrition Assessment: Pt continues to decline from a nutritional stand-point AEB continued ~0-25% intake of most meals x ~12 days now since adm d/t poor appetite w/ AMS/Dysphagia 2/2 MRDD w/ Lung/Liver Mets per EMR review. Remains at further risk d/t failed PEG placement 2/2 abd mass location. Noted plan for hospice at this time. Malnutrition Assessment:  · Malnutrition Status: Meets the criteria for severe malnutrition  · Context: Acute illness or injury  · Findings of the 6 clinical characteristics of malnutrition (Minimum of 2 out of 6 clinical characteristics is required to make the diagnosis of moderate or severe Protein Calorie Malnutrition based on AND/ASPEN Guidelines):  1. Energy Intake-Less than or equal to 50% of estimated energy requirement, Greater than or equal to 7 days    2. Weight Loss-Unable to assess(2/2 poor EMR wt hx pta), unable to assess  3. Fat Loss-Moderate subcutaneous fat loss, Orbital  4. Muscle Loss-Moderate muscle mass loss, Temples (temporalis muscle), Clavicles (pectoralis and deltoids)  5. Fluid Accumulation-No significant fluid accumulation    6.   Strength-Not measured    Nutrition Risk Level: High    Nutrient Needs:  · Estimated Daily Total Kcal: 6887-9139(30-35 kcals/kg per ABW)  · Estimated Daily Protein (g): 65-80(1.5-1.8 gm/kg per ABW)  · Estimated Daily Total Fluid (ml/day): 4164-3164(1 ml/kcal)    Nutrition Diagnosis:   · Problem: Severe malnutrition, In context of acute illness or injury  · Etiology: related to Catabolic illness(Lung CA w/ Liver Mets and MRDD)     Signs and symptoms:  as evidenced by

## 2020-06-08 NOTE — PROGRESS NOTES
single large pedunculated necrotic myoma correlate   with the uterus which appears to be otherwise of unremarkable   appearance except by fluid contents within the endometrial cavity. ALERT:  ABNORMAL REPORT. XR CHEST PORTABLE   Final Result      No evidence for acute cardiopulmonary process. Assessment:    Active Problems:    Sepsis ([address]    Metastatic malignant neoplasm (HCC)    Leukocytosis    Dehydration    Seizure disorder (HCC)    Acidosis    Severe protein-calorie malnutrition (HCC)  Resolved Problems:    * No resolved hospital problems. *      Plan:  Large RLQ abdominal mass with mets to lungs & Liver - Hemonc & palliative following. As per onc she is not a candidate for any meaningful treatment. She is [facility] as per POA/guardian. On Rocephin + Flagyl for vaginal discharge, cx with e coli ,corynbacterium ,anserobic GNR/GPR , ID following. Failure to thrive - poor po , PEG  was unable to be safely  Placed by gen surg,on pureed diet for dysphagia / IVF , on marinol.     Seizure - continue as per home   On Lovenox fro dvt prophy  [facility]        Electronically signed by Aristeo Stack MD on 6/8/2020 at 8:22 AM

## 2020-06-08 NOTE — PROGRESS NOTES
PALLIATIVE MEDICINE    Palliative medicine consulted for goals of care, code status discussion and family support. Patient with a legal guardian. Patient's code status changed to [facility]  Discharge plan is for patient to return to group home and complete antibiotic course. Hospice will continue to follow. .  No additional palliative medicine needs identified currently. Palliative medicine will sign-off. Please re-consult our service if additional Palliative medicine needs arise or there is a change in the patients condition. Thank you for the consult.     Babar Lewis PA-C

## 2020-06-08 NOTE — PROGRESS NOTES
[address] [place] [address] [facility]  Progress Note  Face to face encounter   SUBJECTIVE:  Chief Complaint   Patient presents with    Vaginal Discharge     yellow    Dehydration     won't eat or drink x2 weeks    Fever     Nursing reports that she is not eating or accepting much food. Tolerating antibiotics otherwise. Review of systems:  As stated above in the chief complaint, otherwise negative. Medications:  Scheduled Meds:   alteplase  2 mg Intracatheter Once    dronabinol  2.5 mg Oral BID    enoxaparin  40 mg Subcutaneous Daily    levetiracetam  1,500 mg Intravenous Q12H    heparin flush  3 mL Intravenous 2 times per day    calcium-vitamin D  1 tablet Oral TID    docusate sodium  100 mg Oral Nightly    lacosamide  150 mg Oral BID    therapeutic multivitamin-minerals  1 tablet Oral Daily    senna  1 tablet Oral Nightly    polyethylene glycol  17 g Oral Daily    lubiprostone  24 mcg Oral BID WC    sodium chloride flush  10 mL Intravenous 2 times per day    metroNIDAZOLE  500 mg Intravenous Q8H    cefTRIAXone (ROCEPHIN) IV  2 g Intravenous Q24H     Continuous Infusions:   dextrose 5 % and 0.45 % NaCl 75 mL/hr at 20 0356     PRN Meds:morphine, sodium chloride flush, heparin flush, guaiFENesin-dextromethorphan, HYDROcodone-acetaminophen, sodium chloride flush, acetaminophen **OR** acetaminophen, polyethylene glycol, promethazine **OR** ondansetron    OBJECTIVE:  BP (!) 117/56   Pulse 94   Temp 98.7 °F (37.1 °C) (Axillary)   Resp 20   Ht 5' 2\" (1.575 m)   Wt 120 lb 3 oz (54.5 kg)   SpO2 95%   BMI 21.98 kg/m²   Temp  Av.4 °F (37.4 °C)  Min: 98.7 °F (37.1 °C)  Max: 100 °F (37.8 °C)  Constitutional: The patient is lying in bed. She is nonverbal, does not respond to voice. Contracted. Severe scoliosis. No distress. Eyes open- no distress. Skin: Warm and dry. No rashes were noted. HEENT: Round and reactive pupils. Dry mucous membranes.   No ulcerations or

## 2020-06-09 NOTE — CARE COORDINATION
No feeding tube planned; pt to complete course of IV atb's  Wed 6/10; pt then will return to [address] [place] [address] under Hospice care. ([facility]). -will follow. Mavis Velasquez.

## 2020-06-09 NOTE — PROGRESS NOTES
[facility] Quality Flow/Interdisciplinary Rounds Progress Note        Quality Flow Rounds held on June 9, 2020    Disciplines Attending:  Bedside Nurse, ,  and Nursing Unit Leadership    Kristina Moss was admitted on 5/27/2020  5:54 PM    Anticipated Discharge Date:  Expected Discharge Date: 06/01/20    Disposition:    Von Score:  Von Scale Score: 10    Readmission Risk              Risk of Unplanned Readmission:        17           Discussed patient goal for the day, patient clinical progression, and barriers to discharge.   The following Goal(s) of the Day/Commitment(s) have been identified:  d/c tomorrow w/ Hospice      Bill New  June 9, 2020

## 2020-06-10 NOTE — PROGRESS NOTES
determined patient is eligible for inpatient hospice level of care    Attending physician to be notified of changes to plan of care by charge nurse/ bedside RN- they are updated that patient is transferring to the [facility] and require discharge order. RN instructed to leave IV in place. Received transfer time of as soon as possible. [facility] set up for transport at 11 am.  Report called to the [facility].   Message left with legal guardian Ifeoma and updated SW.    Electronically signed by Mariana Rawls RN on 6/10/2020 at 9:46 AM

## 2020-06-10 NOTE — DISCHARGE SUMMARY
ACIDOPHILUS/BIFIDUS PO     alendronate 70 MG tablet  Commonly known as:  FOSAMAX     calcium-vitamin D 500-200 MG-UNIT per tablet  Commonly known as:  OSCAL-500     docusate sodium 100 MG capsule  Commonly known as:  COLACE     HYDROcodone-acetaminophen 5-325 MG per tablet  Commonly known as:  NORCO     lacosamide 150 MG Tabs tablet  Commonly known as:  VIMPAT  Take 1 tablet by mouth 2 times daily for 186 days. levETIRAcetam 750 MG tablet  Commonly known as:  KEPPRA  Take 2 tablets by mouth 2 times daily     lubiprostone 24 MCG capsule  Commonly known as:  AMITIZA     OXYGEN     polycarbophil 625 MG tablet  Commonly known as:  FIBERCON     SENOKOT PO     therapeutic multivitamin-minerals tablet        ASK your doctor about these medications    MiraLax 17 GM/SCOOP powder  Generic drug:  polyethylene glycol           Where to Get Your Medications      These medications [facility] sent to [facility] - [address] [address]    Phone:  [phone]   · lacosamide 150 MG Tabs tablet     You can get these medications from any pharmacy    Bring a paper prescription for each of these medications  · dronabinol 2.5 MG capsule           Note that more than 30 minutes was spent in preparing discharge papers, discussing discharge with patient, medication review, etc.    Signed:  Electronically signed by Bravo Abbasi MD on 6/10/2020 at 10:45 AM    NOTE: This report was transcribed using voice recognition software. Every effort was made to ensure accuracy; however, inadvertent computerized transcription errors may be present.

## 2020-06-10 NOTE — CARE COORDINATION
Social Work / Discharge Planning : JODIE spoke to Ortega [phone]  from [facility]. They are expecting patient to return today to her group home after her IV antibiotic is finished. Legal Guardian Ifeoma Breaux also aware of plan. [facility] cannot transport patient. JODIE called [facility] and spoke to Vandana Lyon. She will provide liason with SW number so discharge can be cordinated. Patient will need COVID test prior to discharge. SW await call from [place] [phone]. JODIE to follow. Electronically signed by KIRSTEN Craig on 6/10/20 at 8:38 AM EDT      Addendum : JODIE received call from Acacia from [place] [phone]. Plans have changed and patient NOW going to [facility] per ACACIA. Acacia stated she spoke to patient legal guardian and in agreement with the plan. JODIE reached out to ACACIA to schedudule transport. AWait time. Await COVID. JODIE to  follow. Electronically signed by KIRSTEN Craig on 6/10/20 at 9:34 AM EDT    Addendum :JODIE updated Mae Vivas with change of plans for patient and now going to [facility]. JODIE to follow. Electronically signed by KIRSTEN Craig on 6/10/20 at 11:20 AM EDT    Addendum : JODIE called [facility] and spoke to [address] and updated her in regards to patient discharge [facility] and that they need to come get patient wheelchair. She stated she will send someone out. JODIE to follow.  Electronically signed by KIRSTEN Craig on 6/10/20 at 12:12 PM EDT

## 2020-06-19 LAB
SARS-COV-2: NOT DETECTED
SOURCE: NORMAL

## 2024-06-14 NOTE — ANESTHESIA PRE PROCEDURE
tablet Oral Nightly Audra Dias MD   8.6 mg at 05/30/20 2107    polyethylene glycol (GLYCOLAX) packet 17 g  17 g Oral Daily Audra Dias MD   17 g at 06/01/20 0938    lubiprostone (AMITIZA) capsule 24 mcg  24 mcg Oral BID WC Audra Dias MD   24 mcg at 06/03/20 0811    sodium chloride flush 0.9 % injection 10 mL  10 mL Intravenous 2 times per day Mauro Jimenez MD   10 mL at 06/04/20 0956    sodium chloride flush 0.9 % injection 10 mL  10 mL Intravenous PRN Mauro Jimenez MD   10 mL at 05/29/20 0711    acetaminophen (TYLENOL) tablet 650 mg  650 mg Oral Q6H PRN Mauro Jimenez MD   650 mg at 05/28/20 2139    Or    acetaminophen (TYLENOL) suppository 650 mg  650 mg Rectal Q6H PRN Mauro Jimenez MD   650 mg at 06/03/20 1227    polyethylene glycol (GLYCOLAX) packet 17 g  17 g Oral Daily PRN Audra Dias MD        promethazine (PHENERGAN) tablet 12.5 mg  12.5 mg Oral Q6H PRN Audra Dias MD        Or    ondansetron (ZOFRAN) injection 4 mg  4 mg Intravenous Q6H PRN Audra Dias MD        metronidazole (FLAGYL) 500 mg in NaCl 100 mL IVPB premix  500 mg Intravenous Q8H Toi العراقي MD   Stopped at 06/04/20 1934    cefTRIAXone (ROCEPHIN) 2 g in sodium chloride flush 20 mL IV syringe  2 g Intravenous Q24H Toi العراقي MD   2 g at 06/04/20 1741       Allergies:  No Known Allergies    Problem List:    Patient Active Problem List   Diagnosis Code    Seizure ([facility] R56.9    CAP (community acquired pneumonia) J18.9    Sepsis due to pneumonia ([facility] J18.9, A41.9    Systemic inflammatory response syndrome (SIRS) due to infectious process without acute organ dysfunction [id number]    Sepsis ([facility] A41.9    Metastatic malignant neoplasm (HCC) C79.9    Leukocytosis D72.829    Dehydration E86.0    Seizure disorder ([facility] G40.909    Acidosis E87.2    Severe protein-calorie malnutrition ([facility] E43       Past Medical History:        Diagnosis Date    Amenorrhea     Anemia     Constipation ambulatory

## (undated) DEVICE — 3M™ MICROPORE™ TAPE, 1530-2: Brand: 3M™ MICROPORE™

## (undated) DEVICE — Device

## (undated) DEVICE — BLOCK BITE 60FR RUBBER ADLT DENTAL

## (undated) DEVICE — TOWEL,OR,DSP,ST,BLUE,STD,6/PK,12PK/CS: Brand: MEDLINE

## (undated) DEVICE — GRADUATE TRIANG MEASURE 1000ML BLK PRNT